# Patient Record
Sex: MALE | Race: WHITE | Employment: OTHER | URBAN - METROPOLITAN AREA
[De-identification: names, ages, dates, MRNs, and addresses within clinical notes are randomized per-mention and may not be internally consistent; named-entity substitution may affect disease eponyms.]

---

## 2017-11-03 ENCOUNTER — HOSPITAL ENCOUNTER (OUTPATIENT)
Dept: MRI IMAGING | Age: 82
Discharge: HOME | End: 2017-11-03
Attending: NEUROLOGICAL SURGERY
Payer: MEDICARE

## 2017-11-03 DIAGNOSIS — M48.062 LUMBAR STENOSIS WITH NEUROGENIC CLAUDICATION: ICD-10-CM

## 2017-11-03 PROCEDURE — 72148 MRI LUMBAR SPINE W/O DYE: CPT

## 2018-01-18 ENCOUNTER — HOSPITAL ENCOUNTER (OUTPATIENT)
Dept: MRI IMAGING | Age: 83
Discharge: HOME | End: 2018-01-18
Attending: NEUROLOGICAL SURGERY
Payer: MEDICARE

## 2018-01-18 DIAGNOSIS — S22.009A CLOSED FRACTURE OF DORSAL (THORACIC) VERTEBRA (HCC): ICD-10-CM

## 2018-01-18 PROCEDURE — 72146 MRI CHEST SPINE W/O DYE: CPT

## 2018-02-01 ENCOUNTER — HOSPITAL ENCOUNTER (OUTPATIENT)
Dept: PREADMISSION TESTING | Age: 83
Discharge: HOME | End: 2018-02-01
Payer: MEDICARE

## 2018-02-01 VITALS
HEART RATE: 63 BPM | SYSTOLIC BLOOD PRESSURE: 107 MMHG | WEIGHT: 191 LBS | BODY MASS INDEX: 28.29 KG/M2 | TEMPERATURE: 97.6 F | HEIGHT: 69 IN | DIASTOLIC BLOOD PRESSURE: 53 MMHG

## 2018-02-01 LAB
ABO + RH BLD: NORMAL
ANION GAP SERPL CALC-SCNC: 8 MMOL/L (ref 5–15)
APTT PPP: 27.3 SEC (ref 22.1–32.5)
BASOPHILS # BLD: 0.1 K/UL (ref 0–0.1)
BASOPHILS NFR BLD: 1 % (ref 0–1)
BLOOD GROUP ANTIBODIES SERPL: NORMAL
BUN SERPL-MCNC: 17 MG/DL (ref 6–20)
BUN/CREAT SERPL: 14 (ref 12–20)
CALCIUM SERPL-MCNC: 8.8 MG/DL (ref 8.5–10.1)
CHLORIDE SERPL-SCNC: 105 MMOL/L (ref 97–108)
CO2 SERPL-SCNC: 29 MMOL/L (ref 21–32)
CREAT SERPL-MCNC: 1.2 MG/DL (ref 0.7–1.3)
DIFFERENTIAL METHOD BLD: NORMAL
EOSINOPHIL # BLD: 0 K/UL (ref 0–0.4)
EOSINOPHIL NFR BLD: 0 % (ref 0–7)
ERYTHROCYTE [DISTWIDTH] IN BLOOD BY AUTOMATED COUNT: 13 % (ref 11.5–14.5)
GLUCOSE SERPL-MCNC: 119 MG/DL (ref 65–100)
HCT VFR BLD AUTO: 41.4 % (ref 36.6–50.3)
HGB BLD-MCNC: 13.7 G/DL (ref 12.1–17)
IMM GRANULOCYTES # BLD: 0 K/UL (ref 0–0.04)
IMM GRANULOCYTES NFR BLD AUTO: 0 % (ref 0–0.5)
INR PPP: 1 (ref 0.9–1.1)
LYMPHOCYTES # BLD: 2.4 K/UL (ref 0.8–3.5)
LYMPHOCYTES NFR BLD: 32 % (ref 12–49)
MCH RBC QN AUTO: 31.7 PG (ref 26–34)
MCHC RBC AUTO-ENTMCNC: 33.1 G/DL (ref 30–36.5)
MCV RBC AUTO: 95.8 FL (ref 80–99)
MONOCYTES # BLD: 0.6 K/UL (ref 0–1)
MONOCYTES NFR BLD: 9 % (ref 5–13)
NEUTS SEG # BLD: 4.4 K/UL (ref 1.8–8)
NEUTS SEG NFR BLD: 59 % (ref 32–75)
NRBC # BLD: 0 K/UL (ref 0–0.01)
NRBC BLD-RTO: 0 PER 100 WBC
PLATELET # BLD AUTO: 298 K/UL (ref 150–400)
PMV BLD AUTO: 9.5 FL (ref 8.9–12.9)
POTASSIUM SERPL-SCNC: 4.4 MMOL/L (ref 3.5–5.1)
PROTHROMBIN TIME: 10.5 SEC (ref 9–11.1)
RBC # BLD AUTO: 4.32 M/UL (ref 4.1–5.7)
SODIUM SERPL-SCNC: 142 MMOL/L (ref 136–145)
SPECIMEN EXP DATE BLD: NORMAL
THERAPEUTIC RANGE,PTTT: NORMAL SECS (ref 58–77)
WBC # BLD AUTO: 7.5 K/UL (ref 4.1–11.1)

## 2018-02-01 PROCEDURE — 85730 THROMBOPLASTIN TIME PARTIAL: CPT | Performed by: NEUROLOGICAL SURGERY

## 2018-02-01 PROCEDURE — 36415 COLL VENOUS BLD VENIPUNCTURE: CPT | Performed by: NEUROLOGICAL SURGERY

## 2018-02-01 PROCEDURE — 80048 BASIC METABOLIC PNL TOTAL CA: CPT | Performed by: NEUROLOGICAL SURGERY

## 2018-02-01 PROCEDURE — 93005 ELECTROCARDIOGRAM TRACING: CPT

## 2018-02-01 PROCEDURE — 86900 BLOOD TYPING SEROLOGIC ABO: CPT | Performed by: NEUROLOGICAL SURGERY

## 2018-02-01 PROCEDURE — 85610 PROTHROMBIN TIME: CPT | Performed by: NEUROLOGICAL SURGERY

## 2018-02-01 PROCEDURE — 85025 COMPLETE CBC W/AUTO DIFF WBC: CPT | Performed by: NEUROLOGICAL SURGERY

## 2018-02-01 RX ORDER — NAPROXEN SODIUM 220 MG
440 TABLET ORAL
COMMUNITY
End: 2018-02-10

## 2018-02-01 RX ORDER — POTASSIUM CHLORIDE 750 MG/1
TABLET, FILM COATED, EXTENDED RELEASE ORAL DAILY
COMMUNITY

## 2018-02-01 RX ORDER — CHOLECALCIFEROL (VITAMIN D3) 125 MCG
CAPSULE ORAL DAILY
COMMUNITY

## 2018-02-01 RX ORDER — VALSARTAN AND HYDROCHLOROTHIAZIDE 80; 12.5 MG/1; MG/1
1 TABLET, FILM COATED ORAL DAILY
COMMUNITY

## 2018-02-02 LAB
ATRIAL RATE: 55 BPM
BACTERIA SPEC CULT: NORMAL
BACTERIA SPEC CULT: NORMAL
CALCULATED P AXIS, ECG09: 41 DEGREES
CALCULATED R AXIS, ECG10: 0 DEGREES
CALCULATED T AXIS, ECG11: 32 DEGREES
DIAGNOSIS, 93000: NORMAL
P-R INTERVAL, ECG05: 172 MS
Q-T INTERVAL, ECG07: 418 MS
QRS DURATION, ECG06: 80 MS
QTC CALCULATION (BEZET), ECG08: 399 MS
SERVICE CMNT-IMP: NORMAL
VENTRICULAR RATE, ECG03: 55 BPM

## 2018-02-07 ENCOUNTER — ANESTHESIA EVENT (OUTPATIENT)
Dept: SURGERY | Age: 83
DRG: 460 | End: 2018-02-07
Payer: MEDICARE

## 2018-02-08 ENCOUNTER — HOSPITAL ENCOUNTER (INPATIENT)
Age: 83
LOS: 2 days | Discharge: HOME | DRG: 460 | End: 2018-02-10
Attending: NEUROLOGICAL SURGERY | Admitting: NEUROLOGICAL SURGERY
Payer: MEDICARE

## 2018-02-08 ENCOUNTER — APPOINTMENT (OUTPATIENT)
Dept: GENERAL RADIOLOGY | Age: 83
DRG: 460 | End: 2018-02-08
Attending: NEUROLOGICAL SURGERY
Payer: MEDICARE

## 2018-02-08 ENCOUNTER — ANESTHESIA (OUTPATIENT)
Dept: SURGERY | Age: 83
DRG: 460 | End: 2018-02-08
Payer: MEDICARE

## 2018-02-08 DIAGNOSIS — M48.062 LUMBAR STENOSIS WITH NEUROGENIC CLAUDICATION: Primary | ICD-10-CM

## 2018-02-08 PROBLEM — M48.061 LUMBAR STENOSIS: Status: ACTIVE | Noted: 2018-02-08

## 2018-02-08 PROBLEM — G95.19 NEUROGENIC CLAUDICATION (HCC): Status: ACTIVE | Noted: 2018-02-08

## 2018-02-08 LAB
GLUCOSE BLD STRIP.AUTO-MCNC: 99 MG/DL (ref 65–100)
SERVICE CMNT-IMP: NORMAL

## 2018-02-08 PROCEDURE — 77030004391 HC BUR FLUT MEDT -C: Performed by: NEUROLOGICAL SURGERY

## 2018-02-08 PROCEDURE — 77030002933 HC SUT MCRYL J&J -A: Performed by: NEUROLOGICAL SURGERY

## 2018-02-08 PROCEDURE — 77030007115 HC CGE SPN PEK CRNT NUVA -I3: Performed by: NEUROLOGICAL SURGERY

## 2018-02-08 PROCEDURE — 0SG00A0 FUSION OF LUMBAR VERTEBRAL JOINT WITH INTERBODY FUSION DEVICE, ANTERIOR APPROACH, ANTERIOR COLUMN, OPEN APPROACH: ICD-10-PCS | Performed by: NEUROLOGICAL SURGERY

## 2018-02-08 PROCEDURE — 77030034850: Performed by: NEUROLOGICAL SURGERY

## 2018-02-08 PROCEDURE — 76010000171 HC OR TIME 2 TO 2.5 HR INTENSV-TIER 1: Performed by: NEUROLOGICAL SURGERY

## 2018-02-08 PROCEDURE — 76060000035 HC ANESTHESIA 2 TO 2.5 HR: Performed by: NEUROLOGICAL SURGERY

## 2018-02-08 PROCEDURE — 77030030409 HC DIL SPN KT M5 DISP NUVA -G: Performed by: NEUROLOGICAL SURGERY

## 2018-02-08 PROCEDURE — 74011250636 HC RX REV CODE- 250/636

## 2018-02-08 PROCEDURE — 77030008684 HC TU ET CUF COVD -B: Performed by: NURSE ANESTHETIST, CERTIFIED REGISTERED

## 2018-02-08 PROCEDURE — 77030038600 HC TU BPLR IRR DISP STRY -B: Performed by: NEUROLOGICAL SURGERY

## 2018-02-08 PROCEDURE — 74011000272 HC RX REV CODE- 272: Performed by: NEUROLOGICAL SURGERY

## 2018-02-08 PROCEDURE — 77030019908 HC STETH ESOPH SIMS -A: Performed by: NURSE ANESTHETIST, CERTIFIED REGISTERED

## 2018-02-08 PROCEDURE — 74011250636 HC RX REV CODE- 250/636: Performed by: NEUROLOGICAL SURGERY

## 2018-02-08 PROCEDURE — 74011000250 HC RX REV CODE- 250

## 2018-02-08 PROCEDURE — 77030032490 HC SLV COMPR SCD KNE COVD -B: Performed by: NEUROLOGICAL SURGERY

## 2018-02-08 PROCEDURE — 77030008467 HC STPLR SKN COVD -B: Performed by: NEUROLOGICAL SURGERY

## 2018-02-08 PROCEDURE — 77030013079 HC BLNKT BAIR HGGR 3M -A: Performed by: NURSE ANESTHETIST, CERTIFIED REGISTERED

## 2018-02-08 PROCEDURE — 65270000032 HC RM SEMIPRIVATE

## 2018-02-08 PROCEDURE — 77030027703 HC MAXCESS 4 KT DISP NUVA -H: Performed by: NEUROLOGICAL SURGERY

## 2018-02-08 PROCEDURE — 77030020486 HC ELECTRD EMG KT NUVA -G1: Performed by: NEUROLOGICAL SURGERY

## 2018-02-08 PROCEDURE — 77030029099 HC BN WAX SSPC -A: Performed by: NEUROLOGICAL SURGERY

## 2018-02-08 PROCEDURE — 82962 GLUCOSE BLOOD TEST: CPT

## 2018-02-08 PROCEDURE — 74011250636 HC RX REV CODE- 250/636: Performed by: ANESTHESIOLOGY

## 2018-02-08 PROCEDURE — 77030010507 HC ADH SKN DERMBND J&J -B: Performed by: NEUROLOGICAL SURGERY

## 2018-02-08 PROCEDURE — 77030003029 HC SUT VCRL J&J -B: Performed by: NEUROLOGICAL SURGERY

## 2018-02-08 PROCEDURE — 72100 X-RAY EXAM L-S SPINE 2/3 VWS: CPT

## 2018-02-08 PROCEDURE — 77030020782 HC GWN BAIR PAWS FLX 3M -B

## 2018-02-08 PROCEDURE — 77030012890

## 2018-02-08 PROCEDURE — 76001 XR FLUOROSCOPY OVER 60 MINUTES: CPT

## 2018-02-08 PROCEDURE — C1713 ANCHOR/SCREW BN/BN,TIS/BN: HCPCS | Performed by: NEUROLOGICAL SURGERY

## 2018-02-08 PROCEDURE — 74011000250 HC RX REV CODE- 250: Performed by: NEUROLOGICAL SURGERY

## 2018-02-08 PROCEDURE — 77030025706 HC GRFT BN SUB DBM NUVA -I3: Performed by: NEUROLOGICAL SURGERY

## 2018-02-08 PROCEDURE — 76210000001 HC OR PH I REC 2.5 TO 3 HR: Performed by: NEUROLOGICAL SURGERY

## 2018-02-08 PROCEDURE — 8E0WXBF COMPUTER ASSISTED PROCEDURE OF TRUNK REGION, WITH FLUOROSCOPY: ICD-10-PCS | Performed by: NEUROLOGICAL SURGERY

## 2018-02-08 DEVICE — BOLT SPNL L L45MM DIA5.5MM TI XLIF THRD DECADE: Type: IMPLANTABLE DEVICE | Site: SPINE LUMBAR | Status: FUNCTIONAL

## 2018-02-08 DEVICE — GRAFT CHIP CANC OSTEOCEL 10CC: Type: IMPLANTABLE DEVICE | Site: SPINE LUMBAR | Status: FUNCTIONAL

## 2018-02-08 DEVICE — IMPLANTABLE DEVICE: Type: IMPLANTABLE DEVICE | Site: SPINE LUMBAR | Status: FUNCTIONAL

## 2018-02-08 RX ORDER — LOSARTAN POTASSIUM 50 MG/1
50 TABLET ORAL DAILY
Status: DISCONTINUED | OUTPATIENT
Start: 2018-02-09 | End: 2018-02-10 | Stop reason: HOSPADM

## 2018-02-08 RX ORDER — VANCOMYCIN/0.9 % SOD CHLORIDE 1 G/100 ML
1000 PLASTIC BAG, INJECTION (ML) INTRAVENOUS ONCE
Status: DISCONTINUED | OUTPATIENT
Start: 2018-02-08 | End: 2018-02-08 | Stop reason: DRUGHIGH

## 2018-02-08 RX ORDER — GLYCOPYRROLATE 0.2 MG/ML
INJECTION INTRAMUSCULAR; INTRAVENOUS AS NEEDED
Status: DISCONTINUED | OUTPATIENT
Start: 2018-02-08 | End: 2018-02-08 | Stop reason: HOSPADM

## 2018-02-08 RX ORDER — HYDROMORPHONE HYDROCHLORIDE 2 MG/1
2 TABLET ORAL
Status: DISCONTINUED | OUTPATIENT
Start: 2018-02-08 | End: 2018-02-10 | Stop reason: HOSPADM

## 2018-02-08 RX ORDER — MIDAZOLAM HYDROCHLORIDE 1 MG/ML
0.5 INJECTION, SOLUTION INTRAMUSCULAR; INTRAVENOUS
Status: DISCONTINUED | OUTPATIENT
Start: 2018-02-08 | End: 2018-02-08 | Stop reason: HOSPADM

## 2018-02-08 RX ORDER — HYDROCHLOROTHIAZIDE 25 MG/1
12.5 TABLET ORAL DAILY
Status: DISCONTINUED | OUTPATIENT
Start: 2018-02-09 | End: 2018-02-10 | Stop reason: HOSPADM

## 2018-02-08 RX ORDER — FENTANYL CITRATE 50 UG/ML
INJECTION, SOLUTION INTRAMUSCULAR; INTRAVENOUS AS NEEDED
Status: DISCONTINUED | OUTPATIENT
Start: 2018-02-08 | End: 2018-02-08 | Stop reason: HOSPADM

## 2018-02-08 RX ORDER — SODIUM CHLORIDE 9 MG/ML
125 INJECTION, SOLUTION INTRAVENOUS CONTINUOUS
Status: DISPENSED | OUTPATIENT
Start: 2018-02-08 | End: 2018-02-09

## 2018-02-08 RX ORDER — SODIUM CHLORIDE, SODIUM LACTATE, POTASSIUM CHLORIDE, CALCIUM CHLORIDE 600; 310; 30; 20 MG/100ML; MG/100ML; MG/100ML; MG/100ML
INJECTION, SOLUTION INTRAVENOUS
Status: DISCONTINUED | OUTPATIENT
Start: 2018-02-08 | End: 2018-02-08 | Stop reason: HOSPADM

## 2018-02-08 RX ORDER — KETOROLAC TROMETHAMINE 30 MG/ML
15 INJECTION, SOLUTION INTRAMUSCULAR; INTRAVENOUS EVERY 6 HOURS
Status: COMPLETED | OUTPATIENT
Start: 2018-02-08 | End: 2018-02-09

## 2018-02-08 RX ORDER — HYDROMORPHONE HYDROCHLORIDE 4 MG/1
4 TABLET ORAL
Status: DISCONTINUED | OUTPATIENT
Start: 2018-02-08 | End: 2018-02-10 | Stop reason: HOSPADM

## 2018-02-08 RX ORDER — SUCCINYLCHOLINE CHLORIDE 20 MG/ML
INJECTION INTRAMUSCULAR; INTRAVENOUS AS NEEDED
Status: DISCONTINUED | OUTPATIENT
Start: 2018-02-08 | End: 2018-02-08 | Stop reason: HOSPADM

## 2018-02-08 RX ORDER — DIPHENHYDRAMINE HCL 25 MG
25 CAPSULE ORAL
Status: DISCONTINUED | OUTPATIENT
Start: 2018-02-08 | End: 2018-02-10 | Stop reason: HOSPADM

## 2018-02-08 RX ORDER — SODIUM CHLORIDE 0.9 % (FLUSH) 0.9 %
5-10 SYRINGE (ML) INJECTION AS NEEDED
Status: DISCONTINUED | OUTPATIENT
Start: 2018-02-08 | End: 2018-02-08 | Stop reason: HOSPADM

## 2018-02-08 RX ORDER — ROCURONIUM BROMIDE 10 MG/ML
INJECTION, SOLUTION INTRAVENOUS AS NEEDED
Status: DISCONTINUED | OUTPATIENT
Start: 2018-02-08 | End: 2018-02-08 | Stop reason: HOSPADM

## 2018-02-08 RX ORDER — SODIUM CHLORIDE, SODIUM LACTATE, POTASSIUM CHLORIDE, CALCIUM CHLORIDE 600; 310; 30; 20 MG/100ML; MG/100ML; MG/100ML; MG/100ML
100 INJECTION, SOLUTION INTRAVENOUS CONTINUOUS
Status: DISCONTINUED | OUTPATIENT
Start: 2018-02-08 | End: 2018-02-08 | Stop reason: HOSPADM

## 2018-02-08 RX ORDER — ONDANSETRON 2 MG/ML
INJECTION INTRAMUSCULAR; INTRAVENOUS AS NEEDED
Status: DISCONTINUED | OUTPATIENT
Start: 2018-02-08 | End: 2018-02-08 | Stop reason: HOSPADM

## 2018-02-08 RX ORDER — VANCOMYCIN 1.75 GRAM/500 ML IN 0.9 % SODIUM CHLORIDE INTRAVENOUS
1750 ONCE
Status: COMPLETED | OUTPATIENT
Start: 2018-02-08 | End: 2018-02-08

## 2018-02-08 RX ORDER — VANCOMYCIN 1.75 GRAM/500 ML IN 0.9 % SODIUM CHLORIDE INTRAVENOUS
1750 EVERY 12 HOURS
Status: COMPLETED | OUTPATIENT
Start: 2018-02-08 | End: 2018-02-08

## 2018-02-08 RX ORDER — HYDROMORPHONE HYDROCHLORIDE 1 MG/ML
1 INJECTION, SOLUTION INTRAMUSCULAR; INTRAVENOUS; SUBCUTANEOUS
Status: DISPENSED | OUTPATIENT
Start: 2018-02-08 | End: 2018-02-09

## 2018-02-08 RX ORDER — SODIUM CHLORIDE 0.9 % (FLUSH) 0.9 %
5-10 SYRINGE (ML) INJECTION EVERY 8 HOURS
Status: DISCONTINUED | OUTPATIENT
Start: 2018-02-08 | End: 2018-02-08 | Stop reason: HOSPADM

## 2018-02-08 RX ORDER — PROPOFOL 10 MG/ML
INJECTION, EMULSION INTRAVENOUS AS NEEDED
Status: DISCONTINUED | OUTPATIENT
Start: 2018-02-08 | End: 2018-02-08 | Stop reason: HOSPADM

## 2018-02-08 RX ORDER — AMOXICILLIN 250 MG
1 CAPSULE ORAL DAILY
Status: DISCONTINUED | OUTPATIENT
Start: 2018-02-09 | End: 2018-02-10 | Stop reason: HOSPADM

## 2018-02-08 RX ORDER — SODIUM CHLORIDE 0.9 % (FLUSH) 0.9 %
5-10 SYRINGE (ML) INJECTION EVERY 8 HOURS
Status: DISCONTINUED | OUTPATIENT
Start: 2018-02-09 | End: 2018-02-10 | Stop reason: HOSPADM

## 2018-02-08 RX ORDER — BUPIVACAINE HYDROCHLORIDE 5 MG/ML
30 INJECTION, SOLUTION EPIDURAL; INTRACAUDAL ONCE
Status: COMPLETED | OUTPATIENT
Start: 2018-02-08 | End: 2018-02-08

## 2018-02-08 RX ORDER — PROPOFOL 10 MG/ML
INJECTION, EMULSION INTRAVENOUS
Status: DISCONTINUED | OUTPATIENT
Start: 2018-02-08 | End: 2018-02-08 | Stop reason: HOSPADM

## 2018-02-08 RX ORDER — MIDAZOLAM HYDROCHLORIDE 1 MG/ML
1 INJECTION, SOLUTION INTRAMUSCULAR; INTRAVENOUS AS NEEDED
Status: DISCONTINUED | OUTPATIENT
Start: 2018-02-08 | End: 2018-02-08 | Stop reason: HOSPADM

## 2018-02-08 RX ORDER — MIDAZOLAM HYDROCHLORIDE 1 MG/ML
INJECTION, SOLUTION INTRAMUSCULAR; INTRAVENOUS AS NEEDED
Status: DISCONTINUED | OUTPATIENT
Start: 2018-02-08 | End: 2018-02-08 | Stop reason: HOSPADM

## 2018-02-08 RX ORDER — FENTANYL CITRATE 50 UG/ML
50 INJECTION, SOLUTION INTRAMUSCULAR; INTRAVENOUS AS NEEDED
Status: DISCONTINUED | OUTPATIENT
Start: 2018-02-08 | End: 2018-02-08 | Stop reason: HOSPADM

## 2018-02-08 RX ORDER — LIDOCAINE HYDROCHLORIDE 10 MG/ML
0.1 INJECTION, SOLUTION EPIDURAL; INFILTRATION; INTRACAUDAL; PERINEURAL AS NEEDED
Status: DISCONTINUED | OUTPATIENT
Start: 2018-02-08 | End: 2018-02-08 | Stop reason: HOSPADM

## 2018-02-08 RX ORDER — POTASSIUM CHLORIDE 750 MG/1
10 TABLET, FILM COATED, EXTENDED RELEASE ORAL DAILY
Status: DISCONTINUED | OUTPATIENT
Start: 2018-02-09 | End: 2018-02-10 | Stop reason: HOSPADM

## 2018-02-08 RX ORDER — ONDANSETRON 2 MG/ML
4 INJECTION INTRAMUSCULAR; INTRAVENOUS
Status: ACTIVE | OUTPATIENT
Start: 2018-02-08 | End: 2018-02-09

## 2018-02-08 RX ORDER — DIPHENHYDRAMINE HYDROCHLORIDE 50 MG/ML
12.5 INJECTION, SOLUTION INTRAMUSCULAR; INTRAVENOUS AS NEEDED
Status: DISCONTINUED | OUTPATIENT
Start: 2018-02-08 | End: 2018-02-08 | Stop reason: HOSPADM

## 2018-02-08 RX ORDER — DEXAMETHASONE SODIUM PHOSPHATE 4 MG/ML
INJECTION, SOLUTION INTRA-ARTICULAR; INTRALESIONAL; INTRAMUSCULAR; INTRAVENOUS; SOFT TISSUE AS NEEDED
Status: DISCONTINUED | OUTPATIENT
Start: 2018-02-08 | End: 2018-02-08 | Stop reason: HOSPADM

## 2018-02-08 RX ORDER — ROPIVACAINE HYDROCHLORIDE 5 MG/ML
150 INJECTION, SOLUTION EPIDURAL; INFILTRATION; PERINEURAL AS NEEDED
Status: DISCONTINUED | OUTPATIENT
Start: 2018-02-08 | End: 2018-02-08 | Stop reason: HOSPADM

## 2018-02-08 RX ORDER — NALOXONE HYDROCHLORIDE 0.4 MG/ML
0.4 INJECTION, SOLUTION INTRAMUSCULAR; INTRAVENOUS; SUBCUTANEOUS AS NEEDED
Status: DISCONTINUED | OUTPATIENT
Start: 2018-02-08 | End: 2018-02-10 | Stop reason: HOSPADM

## 2018-02-08 RX ORDER — MORPHINE SULFATE 4 MG/ML
INJECTION, SOLUTION INTRAMUSCULAR; INTRAVENOUS AS NEEDED
Status: DISCONTINUED | OUTPATIENT
Start: 2018-02-08 | End: 2018-02-08 | Stop reason: HOSPADM

## 2018-02-08 RX ORDER — FENTANYL CITRATE 50 UG/ML
25 INJECTION, SOLUTION INTRAMUSCULAR; INTRAVENOUS
Status: DISCONTINUED | OUTPATIENT
Start: 2018-02-08 | End: 2018-02-08 | Stop reason: HOSPADM

## 2018-02-08 RX ORDER — SODIUM CHLORIDE 0.9 % (FLUSH) 0.9 %
5-10 SYRINGE (ML) INJECTION AS NEEDED
Status: DISCONTINUED | OUTPATIENT
Start: 2018-02-08 | End: 2018-02-10 | Stop reason: HOSPADM

## 2018-02-08 RX ADMIN — KETOROLAC TROMETHAMINE 15 MG: 30 INJECTION, SOLUTION INTRAMUSCULAR at 17:53

## 2018-02-08 RX ADMIN — MEPERIDINE HYDROCHLORIDE 12.5 MG: 25 INJECTION INTRAMUSCULAR; INTRAVENOUS; SUBCUTANEOUS at 10:16

## 2018-02-08 RX ADMIN — KETOROLAC TROMETHAMINE 15 MG: 30 INJECTION, SOLUTION INTRAMUSCULAR at 13:05

## 2018-02-08 RX ADMIN — FENTANYL CITRATE 25 MCG: 50 INJECTION, SOLUTION INTRAMUSCULAR; INTRAVENOUS at 09:42

## 2018-02-08 RX ADMIN — GLYCOPYRROLATE 0.1 MG: 0.2 INJECTION INTRAMUSCULAR; INTRAVENOUS at 08:53

## 2018-02-08 RX ADMIN — ONDANSETRON 4 MG: 2 INJECTION INTRAMUSCULAR; INTRAVENOUS at 09:18

## 2018-02-08 RX ADMIN — PROPOFOL 50 MG: 10 INJECTION, EMULSION INTRAVENOUS at 07:39

## 2018-02-08 RX ADMIN — GLYCOPYRROLATE 0.1 MG: 0.2 INJECTION INTRAMUSCULAR; INTRAVENOUS at 08:50

## 2018-02-08 RX ADMIN — VANCOMYCIN HYDROCHLORIDE 1750 MG: 10 INJECTION, POWDER, LYOPHILIZED, FOR SOLUTION INTRAVENOUS at 06:53

## 2018-02-08 RX ADMIN — HYDROMORPHONE HYDROCHLORIDE 1 MG: 1 INJECTION, SOLUTION INTRAMUSCULAR; INTRAVENOUS; SUBCUTANEOUS at 13:05

## 2018-02-08 RX ADMIN — FENTANYL CITRATE 100 MCG: 50 INJECTION, SOLUTION INTRAMUSCULAR; INTRAVENOUS at 07:39

## 2018-02-08 RX ADMIN — PROPOFOL 50 MG: 10 INJECTION, EMULSION INTRAVENOUS at 07:37

## 2018-02-08 RX ADMIN — VANCOMYCIN HYDROCHLORIDE 1750 MG: 10 INJECTION, POWDER, LYOPHILIZED, FOR SOLUTION INTRAVENOUS at 18:59

## 2018-02-08 RX ADMIN — MEPERIDINE HYDROCHLORIDE 12.5 MG: 25 INJECTION INTRAMUSCULAR; INTRAVENOUS; SUBCUTANEOUS at 11:22

## 2018-02-08 RX ADMIN — MORPHINE SULFATE 1 MG: 4 INJECTION, SOLUTION INTRAMUSCULAR; INTRAVENOUS at 09:53

## 2018-02-08 RX ADMIN — MIDAZOLAM 0.5 MG: 1 INJECTION INTRAMUSCULAR; INTRAVENOUS at 10:16

## 2018-02-08 RX ADMIN — SODIUM CHLORIDE, SODIUM LACTATE, POTASSIUM CHLORIDE, CALCIUM CHLORIDE: 600; 310; 30; 20 INJECTION, SOLUTION INTRAVENOUS at 07:27

## 2018-02-08 RX ADMIN — PROPOFOL 35 MCG/KG/MIN: 10 INJECTION, EMULSION INTRAVENOUS at 07:47

## 2018-02-08 RX ADMIN — FENTANYL CITRATE 50 MCG: 50 INJECTION, SOLUTION INTRAMUSCULAR; INTRAVENOUS at 07:41

## 2018-02-08 RX ADMIN — FENTANYL CITRATE 25 MCG: 50 INJECTION, SOLUTION INTRAMUSCULAR; INTRAVENOUS at 10:45

## 2018-02-08 RX ADMIN — SODIUM CHLORIDE 125 ML/HR: 900 INJECTION, SOLUTION INTRAVENOUS at 19:00

## 2018-02-08 RX ADMIN — PROPOFOL 50 MG: 10 INJECTION, EMULSION INTRAVENOUS at 07:38

## 2018-02-08 RX ADMIN — FENTANYL CITRATE 25 MCG: 50 INJECTION, SOLUTION INTRAMUSCULAR; INTRAVENOUS at 11:21

## 2018-02-08 RX ADMIN — FENTANYL CITRATE 25 MCG: 50 INJECTION, SOLUTION INTRAMUSCULAR; INTRAVENOUS at 08:32

## 2018-02-08 RX ADMIN — MIDAZOLAM HYDROCHLORIDE 1 MG: 1 INJECTION, SOLUTION INTRAMUSCULAR; INTRAVENOUS at 07:32

## 2018-02-08 RX ADMIN — FENTANYL CITRATE 100 MCG: 50 INJECTION, SOLUTION INTRAMUSCULAR; INTRAVENOUS at 07:37

## 2018-02-08 RX ADMIN — HYDROMORPHONE HYDROCHLORIDE 1 MG: 1 INJECTION, SOLUTION INTRAMUSCULAR; INTRAVENOUS; SUBCUTANEOUS at 18:59

## 2018-02-08 RX ADMIN — SUCCINYLCHOLINE CHLORIDE 180 MG: 20 INJECTION INTRAMUSCULAR; INTRAVENOUS at 07:40

## 2018-02-08 RX ADMIN — DEXAMETHASONE SODIUM PHOSPHATE 6 MG: 4 INJECTION, SOLUTION INTRA-ARTICULAR; INTRALESIONAL; INTRAMUSCULAR; INTRAVENOUS; SOFT TISSUE at 09:18

## 2018-02-08 RX ADMIN — FENTANYL CITRATE 25 MCG: 50 INJECTION, SOLUTION INTRAMUSCULAR; INTRAVENOUS at 10:17

## 2018-02-08 RX ADMIN — FENTANYL CITRATE 25 MCG: 50 INJECTION, SOLUTION INTRAMUSCULAR; INTRAVENOUS at 10:55

## 2018-02-08 RX ADMIN — SODIUM CHLORIDE, SODIUM LACTATE, POTASSIUM CHLORIDE, CALCIUM CHLORIDE: 600; 310; 30; 20 INJECTION, SOLUTION INTRAVENOUS at 09:59

## 2018-02-08 RX ADMIN — MIDAZOLAM 0.5 MG: 1 INJECTION INTRAMUSCULAR; INTRAVENOUS at 10:27

## 2018-02-08 RX ADMIN — MIDAZOLAM HYDROCHLORIDE 1 MG: 1 INJECTION, SOLUTION INTRAMUSCULAR; INTRAVENOUS at 07:27

## 2018-02-08 RX ADMIN — ROCURONIUM BROMIDE 5 MG: 10 INJECTION, SOLUTION INTRAVENOUS at 07:39

## 2018-02-08 RX ADMIN — SODIUM CHLORIDE 125 ML/HR: 900 INJECTION, SOLUTION INTRAVENOUS at 11:15

## 2018-02-08 RX ADMIN — SODIUM CHLORIDE, SODIUM LACTATE, POTASSIUM CHLORIDE, AND CALCIUM CHLORIDE 100 ML/HR: 600; 310; 30; 20 INJECTION, SOLUTION INTRAVENOUS at 06:43

## 2018-02-08 NOTE — BRIEF OP NOTE
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  Item Serial No.  Lot No. LRB No. Used Action   GRAFT BNE MATRIX OSTEOCEL 10CC --  - [id number] Bone GRAFT BNE MATRIX OSTEOCEL 10CC --  [id number] NUVASIVE NA N/A 1 Implanted                1 Implanted

## 2018-02-08 NOTE — ANESTHESIA POSTPROCEDURE EVALUATION
Post-Anesthesia Evaluation and Assessment    Patient: Andreia Macdonald MRN: [id number]  SSN: [id number]    YOB: 1935  Age: 81 y.o. Sex: male       Cardiovascular Function/Vital Signs  Visit Vitals    /74    Pulse 86    Temp 36.6 °C (97.9 °F)    Resp 11    Ht 5' 9.25\" (1.759 m)    Wt 86.6 kg (191 lb)    SpO2 91%    BMI 28 kg/m2       Patient is status post general anesthesia for Procedure(s):  L3-4 XLIF WITH ALLOGRAFT AND PLATE (OXYGEN). Nausea/Vomiting: None    Postoperative hydration reviewed and adequate. Pain:  Pain Scale 1: Adult Nonverbal Pain Scale (02/08/18 1005)  Pain Intensity 1: 6 (02/08/18 1005)   Managed    Neurological Status:   Neuro (WDL): Within Defined Limits (02/08/18 1005)  Neuro  Neurologic State: Drowsy; Pharmacologically induced (comment) (02/08/18 1005)  LUE Motor Response: Purposeful (02/08/18 1005)  LLE Motor Response: Purposeful (02/08/18 1005)  RUE Motor Response: Purposeful (02/08/18 1005)  RLE Motor Response: Purposeful (02/08/18 1005)   At baseline    Mental Status and Level of Consciousness: Arousable    Pulmonary Status:   O2 Device: Non-rebreather mask (02/08/18 1005)   Adequate oxygenation and airway patent    Complications related to anesthesia: None    Post-anesthesia assessment completed.  No concerns    Signed By: Bren Lenz MD     February 8, 2018

## 2018-02-08 NOTE — PERIOP NOTES
Patient: Letty Acosta MRN: [id number]  SSN: [id number]   YOB: 1935  Age: 81 y.o. Sex: male     Patient is status post Procedure(s):  L3-4 XLIF WITH ALLOGRAFT AND PLATE (OXYGEN).     Surgeon(s) and Role:     * Delroy Winters MD - Primary    Local/Dose/Irrigation:  0.5% BUPIVACAINE                Peripheral IV 02/08/18 Right Wrist (Active)                           Dressing/Packing:  Wound Flank Left-DRESSING TYPE: Topical skin adhesive/glue (02/08/18 0900)  Splint/Cast:  ]    Other:

## 2018-02-08 NOTE — BRIEF OP NOTE
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  Item Serial No.  Lot No. LRB No. Used Action   GRAFT BNE MATRIX OSTEOCEL 10CC --  - [id number] Bone GRAFT BNE MATRIX OSTEOCEL 10CC --  [id number] NUVASIVE NA N/A 1 Implanted

## 2018-02-08 NOTE — IP AVS SNAPSHOT
[address] [facility] [address] [place] [address] 
[phone] Patient: Jerome Chou MRN: [id number] :1935 About your hospitalization You were admitted on:  2018 You last received care in the:  [address] [place] [address] You were discharged on:  February 10, 2018 Why you were hospitalized Your primary diagnosis was:  Not on File Your diagnoses also included:  Lumbar Stenosis With Neurogenic Claudication Follow-up Information Follow up With Details Comments Contact Info Katy Francois MD   [address] [facility] [address] [place] [address] 7 [phone] 
[phone] Discharge Orders None A check kristin indicates which time of day the medication should be taken. My Medications START taking these medications Instructions Each Dose to Equal  
 Morning Noon Evening Bedtime  
 cyclobenzaprine 10 mg tablet Commonly known as:  FLEXERIL Your last dose was: Your next dose is: Take 1 Tab by mouth three (3) times daily as needed for Muscle Spasm(s). Indications: Muscle Spasm  
 10 mg HYDROmorphone 2 mg tablet Commonly known as:  DILAUDID Your last dose was: Your next dose is: Take 1 Tab by mouth every four (4) hours as needed. Max Daily Amount: 12 mg.  
 2 mg  
    
   
   
   
  
 senna-docusate 8.6-50 mg per tablet Commonly known as:  Skyler Nataliia Your last dose was: Your next dose is: Take 1 Tab by mouth two (2) times a day. 1 Tab CONTINUE taking these medications Instructions Each Dose to Equal  
 Morning Noon Evening Bedtime  
 aspirin 81 mg chewable tablet Your last dose was: Your next dose is: Take 81 mg by mouth daily. 81 mg  
    
   
   
   
  
 potassium chloride SR 10 mEq tablet Commonly known as:  KLOR-CON 10  
   
 Your last dose was: Your next dose is: Take  by mouth daily. valsartan-hydroCHLOROthiazide 80-12.5 mg per tablet Commonly known as:  DIOVAN-HCT Your last dose was: Your next dose is: Take 1 Tab by mouth daily. 1 Tab VITAMIN D3 2,000 unit Tab Generic drug:  cholecalciferol (vitamin D3) Your last dose was: Your next dose is: Take  by mouth daily. STOP taking these medications ALEVE 220 mg tablet Generic drug:  naproxen sodium Where to Get Your Medications Information on where to get these meds will be given to you by the nurse or doctor. ! Ask your nurse or doctor about these medications  
  cyclobenzaprine 10 mg tablet HYDROmorphone 2 mg tablet  
 senna-docusate 8.6-50 mg per tablet Discharge Instructions Lumbar Spinal Fusion: What to Expect at Home Your Recovery After surgery, you can expect your back to feel stiff and sore. You may have trouble sitting or standing in one position for very long and may need pain medicine in the weeks after your surgery. It may take 4 to 6 weeks to get back to doing simple activities, such as light housework. It may take 6 months to a year for your back to get better completely. You may need to wear a back brace while your back heals. And your doctor may have you go to physical therapy. If your job doesn't require physical labor, you will probably be able to go back to work after 1 or 2 months. If your job involves light physical labor, it may take 3 to 6 months. Most people whose jobs involved heavy labor can never return to those jobs. This care sheet gives you a general idea about how long it will take for you to recover. But each person recovers at a different pace. Follow the steps below to get better as quickly as possible. How can you care for yourself at home? Activity ? · Rest when you feel tired. Getting enough sleep will help you recover. ? · Try to walk each day. Start by walking a little more than you did the day before. Bit by bit, increase the amount you walk. Walking boosts blood flow and helps prevent pneumonia and constipation. Walking may also decrease your muscle soreness after surgery. ? · If advised by your doctor, you may need to avoid lifting anything that would cause excessive strain on your back. This may include a child, heavy grocery bags and milk containers, a heavy briefcase or backpack, cat litter or dog food bags, or a vacuum . ? · Avoid strenuous activities, such as bicycle riding, jogging, weight lifting, or aerobic exercise, until your doctor says it is okay. ? · Do not drive for 2 to 4 weeks after your surgery or until your doctor says it is okay. ? · Avoid riding in a car for more than 30 minutes at a time for 2 to 4 weeks after surgery. If you must ride in a car for a longer distance, stop often to walk and stretch your legs. ? · Try to change your position about every 30 minutes while sitting or standing. This will help decrease your back pain while you are healing. ? · You will probably need to take at least 4 to 6 weeks off from work. It depends on the type of work you do and how you feel. ? · You may have sex as soon as you feel able, but avoid positions that put stress on your back or cause pain. Diet ? · You can eat your normal diet. If your stomach is upset, try bland, low-fat foods like plain rice, broiled chicken, toast, and yogurt. ? · Drink plenty of fluids (unless your doctor tells you not to). ? · You may notice that your bowel movements are not regular right after your surgery. This is common. Try to avoid constipation and straining with bowel movements. You may want to take a fiber supplement every day.  If you have not had a bowel movement after a couple of days, ask your doctor about taking a mild laxative. Medicines ? · Be safe with medicines. Take pain medicines exactly as directed. ¨ If the doctor gave you a prescription medicine for pain, take it as prescribed. ¨ If you are not taking a prescription pain medicine, ask your doctor if you can take an over-the-counter medicine. ? · If your doctor prescribed antibiotics, take them as directed. Do not stop taking them just because you feel better. You need to take the full course of antibiotics. ? · If you think your pain medicine is making you sick to your stomach: 
¨ Take your medicine after meals (unless your doctor has told you not to). ¨ Ask your doctor for a different pain medicine. Incision care ? · You will be given specific instructions about how to care for the cuts (incisions) the doctor made. The instructions will depend on the type of materials used to close the cut. Exercise ? · Do back exercises as instructed by your doctor. ? · Your doctor may advise you to work with a physical therapist to improve the strength and flexibility of your back. Other instructions ? · To reduce stiffness and help sore muscles, use a warm water bottle, a heating pad set on low, or a warm cloth on your back. Do not put heat right over the incision. Do not go to sleep with a heating pad on your skin. Follow-up care is a key part of your treatment and safety. Be sure to make and go to all appointments, and call your doctor if you are having problems. It's also a good idea to know your test results and keep a list of the medicines you take. When should you call for help? Call 911 anytime you think you may need emergency care. For example, call if: 
? · You passed out (lost consciousness). ? · You have sudden chest pain and shortness of breath, or you cough up blood. ? · You are unable to move a leg at all. ?Call your doctor now or seek immediate medical care if: 
? · You have pain that does not get better after you take pain pills. ? · You have new or worse symptoms in your legs or buttocks. Symptoms may include: ¨ Numbness or tingling. ¨ Weakness. ¨ Pain. ? · You lose bladder or bowel control. ? · You have loose stitches, or your incision comes open. ? · You have blood or fluid draining from the incision. ? · You have signs of infection, such as: 
¨ Increased pain, swelling, warmth, or redness. ¨ Pus draining from the incision. ¨ A fever. ? Watch closely for any changes in your health, and be sure to contact your doctor if: 
? · You do not have a bowel movement after taking a laxative. ? · You are not getting better as expected. Where can you learn more? Go to http://roopa-suzanne.info/. Enter B275 in the search box to learn more about \"Lumbar Spinal Fusion: What to Expect at Home. \" Current as of: March 21, 2017 Content Version: 11.4 © 4355-5712 [employer] ([employer] Care instructions adapted under license by [employer] (which disclaims liability or warranty for this information). If you have questions about a medical condition or this instruction, always ask your healthcare professional. [place] [address] any warranty or liability for your use of this information. Patient meets criteria for BUNDLED PAYMENT  
for Care Improvement Initiative Criteria Contact Information for Orthopedic Nurse Navigator:     
ZOË Abebe, RN-BC 
N:[phone] X:[phone] H:[phone] After Hospital Care Plan:  Discharge Instructions XLIF Surgery Dr. Nelly Cruz Patient Name: Zeina Spenser Date of procedure: 2/8/2018  Date of discharge: 2/9/2018 Procedure: Procedure(s): 
L3-4 XLIF WITH ALLOGRAFT AND PLATE (OXYGEN)  PCP: Indira Arceo MD 
 
Follow up appointments -follow up with Dr. Jolie Platt in 2 weeks. Call [phone] to make an appointment as soon as you get home from the hospital. 
 
When to call your Spine Surgeon: 
-Signs of infection-if your incision is red; continues to have drainage; drainage has a foul odor or if you have a persistent fever over 101 degrees for 24 hours 
-Nausea or vomiting, severe headache 
-Loss of bowel or bladder function, inability to urinate 
-Changes in sensation in your arms or legs (numbness, tingling, loss of color) -Increased weakness-greater than before your surgery 
-Severe pain or pain not relieved by medications 
-Signs of a blood clot in your leg-calf pain, tenderness, redness, swelling of lower leg When to call your Primary Care Physician: 
-Concerns about medical conditions such as diabetes, high blood pressure, asthma, congestive heart failure 
-Call if blood sugars are elevated, persistent headache or dizziness, coughing or congestion, constipation or diarrhea, burning with urination, abnormal heart rate When to call 911 and go to the nearest emergency room: 
-Acute onset of chest pain, shortness of breath, difficulty breathing Activity 
-You are going home a well person, be as active as possible. Your only exercise should be walking. Start with short frequent walks and increase your walking distance each day. 
-Limit the amount of time you sit to 20-30 minute intervals. Sitting for prolonged periods of time will be uncomfortable for you following surgery. 
-Do NOT lift anything over 5 pounds 
-Do NOT do any straining, twisting or bending 
-When you are in bed, you may lay on your back or on either side. Do NOT lie on your stomach Brace 
-If you have a back brace, you should wear your brace at all times when you are out of bed. Do not wear the brace while in bed or showering. 
-Remember to always wear a cotton t-shirt underneath your brace. 
-Do not bend or twist when your brace is off. Diet -Resume usual diet; drink plenty of fluids; eat foods high in fiber 
-It is important to have regular bowel movements. Pain medications may cause constipation. You may want to take a stool softener (such as Senokot-S or Colace) to prevent constipation. 
 -If constipation occurs, take a laxative (such as Dulcolax tablets, Milk of Magnesia, or a suppository). Laxatives should only be used if the above preventable measures have failed and you still have not had a bowel movement after three days Driving 
-You may not drive or return to work until instructed by your physician. However, you may ride in the car for short periods of time. Incision Care 
-You may take brief showers but do not run the water run directly onto the wound. After showering or bathing, gently blot the wound dry with a clean, soft towel. 
-Do not rub or apply any lotions or ointments to your incision site.  
-Do not soak or scrub your wound Showering 
-You may shower in approximately 3 days after your surgery.   
-Leave the dressing on during your shower. Do NOT allow the water to run directly onto your dressing. Once you get out of the shower, put on a dry dressing. 
-Reminder- your brace can be removed while showering. Remember to not bend or twist while your brace is off.   
-Do not take a tub bath. Preventing blood clots 
-You have been given T.E.D. stockings to wear. Continue to wear these for 7 days after your discharge. Put them on in the morning and take them off at night.   
-They are used to increase your circulation and prevent blood clots from forming in your legs 
-T. E.D. stockings can be machine washed, temperature not to exceed 160° F (71°C) and machine dried for 15 to 20 minutes, temperature not to exceed 250° F (121°C). Pain management 
-Take pain medication as prescribed; decrease the amount you use as your pain lessens 
-DO not wait until you are in extreme pain to take your medication. -Avoid alcoholic beverages while taking pain medication Pain Medication Safety DO: 
-Read the Medication Guide  
-Take your medicine exactly as prescribed  
-Store your medicine away from children and in a safe place  
-Flush unused medicine down the toilet  
-Call your healthcare provider for medical advice about side effects. You may report side effects to FDA at 3-420-FDA-0443.  
-Please be aware that many medications contain Tylenol. We do not want you to over medicate so please read the information below as a guide. Do not take more than 4 Grams of Tylenol in a 24 hour period. (There are 1000 milligrams in one Gram) Percocet contains 325 mg of Tylenol per tablet (do not take more than 12 tablets in 24 hours) Lortab contains 500 mg of Tylenol per tablet (do not take more than 8 tablets in 24 hours) Norco contains 325 mg of Tylenol per tablet (do not take more than 12 tablets in 24 hours). DO NOT: 
-Do not give your medicine to others  
-Do not take medicine unless it was prescribed for you  
-Do not stop taking your medicine without talking to your healthcare provider  
-Do not break, chew, crush, dissolve, or inject your medicine. If you cannot swallow your medicine whole, talk to your healthcare provider. 
-Do not drink alcohol while taking this medicine 
-Do not take anti-inflammatory medications or aspirin unless instructed by your   physician. Introducing [facility]!    
 [facility] introduces [employer] patient portal. Now you can access parts of your medical record, email your doctor's office, and request medication refills online. 1. In your internet browser, go to [email or web address] 2. Click on the First Time User? Click Here link in the Sign In box. You will see the New Member Sign Up page. 3. Enter your [employer] Access Code exactly as it appears below. You will not need to use this code after youve completed the sign-up process. If you do not sign up before the expiration date, you must request a new code. · [employer] Access Code: [email or web address] Expires: 5/2/2018  1:55 PM 
 
4. Enter the last four digits of your Social Security Number (xxxx) and Date of Birth (mm/dd/yyyy) as indicated and click Submit. You will be taken to the next sign-up page. 5. Create a [employer] ID. This will be your [employer] login ID and cannot be changed, so think of one that is secure and easy to remember. 6. Create a [employer] password. You can change your password at any time. 7. Enter your Password Reset Question and Answer. This can be used at a later time if you forget your password. 8. Enter your e-mail address. You will receive e-mail notification when new information is available in [address] 9. Click Sign Up. You can now view and download portions of your medical record. 10. Click the Download Summary menu link to download a portable copy of your medical information. If you have questions, please visit the Frequently Asked Questions section of the [employer] website. Remember, [employer] is NOT to be used for urgent needs. For medical emergencies, dial 911. Now available from your iPhone and Android! Providers Seen During Your Hospitalization Provider Specialty Primary office phone Aracely Muñoz MD Neurosurgery [phone] Your Primary Care Physician (PCP) Primary Care Physician Office Phone Office Fax Dagoberto Sow [phone] You are allergic to the following Allergen Reactions Amoxicillin Rash Hydrocodone Hives Penicillins Hives Percocet (Oxycodone-Acetaminophen) Itching Recent Documentation Height Weight BMI Smoking Status 1.759 m 86.6 kg 28 kg/m2 Never Smoker Emergency Contacts Name Discharge Info Relation Home Work Mobile Mat Hwang DISCHARGE CAREGIVER [3] Spouse [3] [phone] Patient Belongings The following personal items are in your possession at time of discharge: 
  Dental Appliances: None  Visual Aid: None             Clothing:  (clothes to pacu) Please provide this summary of care documentation to your next provider. Signatures-by signing, you are acknowledging that this After Visit Summary has been reviewed with you and you have received a copy. Patient Signature:  ____________________________________________________________ Date:  ____________________________________________________________  
  
[facility] Provider Signature:  ____________________________________________________________ Date:  ____________________________________________________________

## 2018-02-08 NOTE — OP NOTES
[address]   ACUTE CARE OP NOTE    Natalio Robles  MR#: [id number]  : 1935  ACCOUNT #: [de-identified]   DATE OF SERVICE: 2018    PREOPERATIVE DIAGNOSIS:  L3-4 spondylolisthesis and stenosis. POSTOPERATIVE DIAGNOSIS:  L3-4 spondylolisthesis and stenosis. PROCEDURE:  Lumbar 3-4 extreme lateral interbody fusion with instrumentation via a left transpsoas approach, use of intraoperative spinal cord monitoring and real-time EMG. SURGEON:  Ruben Andrea MD     ASSISTANT:  Jamie Bucio    ANESTHESIA:  General endotracheal anesthesia. BLOOD LOSS:  30 mL. COMPLICATIONS:  None. SPECIMENS:  None. IMPLANTS:  .    OPERATIVE INDICATIONS:  An 81year-old gentleman with severe progressive back pain, intermittent neurogenic claudication. MRI showed a grade I spondylolisthesis at L3-4 with some moderate stenosis. After discussing treatment options and failing all nonoperative measures, informed consent was obtained. OPERATION IN DETAIL:  The patient was taken to the operating room and placed under general endotracheal anesthesia. All necessary lines and monitors were placed. He was given appropriate dose of IV antibiotics. SCDs and Rutherford were placed. He was hooked up for neurologic monitoring, which was performed by neurophysiology throughout the case with no detrimental changes. He was placed lateral left side up. All pressure points were padded. The bed was broken slightly over the iliac crest.  He was secured to the bed carefully with heavy tape. I used fluoroscopy to kristin my landmarks and then a small linear incision was made in the left flank after he was prepped and draped sterilely. The incision was carried down with Bovie electrocautery through the subcutaneous tissue. The muscle was divided along its fibers. A self-retaining retractor was placed.   I then opened the transversus fascia down to the retroperitoneal space, identified retroperitoneal fat. With palpable sweeping motions with my hand as well as a sponge stick, I swept away the peritoneum anteriorly to expose the psoas muscle. I then used the NuVasive minimally invasive dilator set, dilating over the psoas muscle. On my first pass I did stimulate the nerves a little bit too high. I therefore repositioned my dilators more anteriorly in the disk space. I placed the dilators progressively over the L3-4 disk space and stimulated 360 degrees with adequate stimulation, no evidence of any crossing nerve roots. I then used the [email or web address] minimally invasive retractor, placed this over the dilators and locked this into position. I opened this slightly, placed the self-holding retractor lights and removed the dilators, visualizing the disk space. I stimulated without any crossing nerve fibers. The holding claudio was placed at about the 30-40 yard line of the disk space. This was all done under fluoroscopy. The retractor was opened further. I then performed an annulotomy, removed the disk from the lateral approach. I then cleaned out the disk space with the NuVasive interbody set and decorticated the endplates. I then progressively dilated the disk space and sized it appropriately. I ultimately used a CoRoent PEEK lordotic spacer measuring 10 mm in height. This was placed in the disk space and all the way across to the other side. This fit well with good apposition, snug fit and appeared to be in good position on AP and lateral fluoro. I then deployed this. I then placed a 2-hole lateral plate with 45 mm screws over L3 and L4. I placed 45 mm screws at L3 and L4 under fluoroscopic guidance, attaching the plate to the lateral vertebral body. There were no detrimental neurologic changes. The patient was stable. I then locked down the screws and the plate. The retractors were removed and the wound was copiously irrigated with antibiotic solution.   Hemostasis was achieved and the wound was then closed using 0 Vicryl to close the muscular and fascial layers and 2-0 Vicryl in the deep dermal layer, running 4-0 Monocryl in a subcuticular fashion. Wounds were cleaned, dried, dressed with sterile dressing. The patient was rolled over, extubated, and taken to recovery in stable condition. MD Yuri Calabrese / Anthony Levine  D: 02/08/2018 11:03     T: 02/08/2018 13:32  JOB #: [id number]  CC: Teri Roche MD

## 2018-02-08 NOTE — ROUTINE PROCESS
TRANSFER - OUT REPORT:    Verbal report given to Vu RN(name) on Nohemy Hester  being transferred to [facility](unit) for routine post - op       Report consisted of patients Situation, Background, Assessment and   Recommendations(SBAR). Time Pre op antibiotic [id number]  Anesthesia Stop time: 0803  Rutherford Present on Transfer to floor:yes  Order for Rutherford on Chart:yes  Discharge Prescriptions with Chart:no    Information from the following report(s) SBAR, Kardex, OR Summary, Procedure Summary and Cardiac Rhythm nsr was reviewed with the receiving nurse. Opportunity for questions and clarification was provided. Is the patient on 02? YES       L/Min 2           Is the patient on a monitor? NO    Is the nurse transporting with the patient? NO    Surgical Waiting Area notified of patient's transfer from PACU?  YES      The following personal items collected during your admission accompanied patient upon transfer:   Dental Appliance: Dental Appliances: None  Vision:    Hearing Aid:    Jewelry:    Clothing: Clothing:  (clothes to [employer])  Other Valuables:    Valuables sent to safe:

## 2018-02-08 NOTE — PROGRESS NOTES
Neurosurgery Spine Progress Note  Kaye Salgado, AGACNP-BC  Work Cell: [phone]    Post Op Day: Day of Surgery    February 8, 2018 5:04 PM     Sloane Ledezma    HPI:    Sloane Ledezma is a 81 y.o. male with a PMH of HTN presented to Dr. July Vaz clinic with severe low back pain. The pain is exacerbated by walking and rolling over in bed. Walking for any amount of time causes severe pain in bilateral legs, making him feel weak or like his legs are made of rubber. His pain has not been relieved by conventional methods, therefore he was consented to undergo surgical intervention. He is now POD 0 from a Procedure(s):  L3-4 XLIF WITH ALLOGRAFT AND PLATE (OXYGEN)    Subjective: Mr. Vikram Martinez was resting comfortably when I entered the room. He awakened to voice and was pleasantly surprised that he had slept most of the afternoon. He c/o 6/10 pain to his back at this time. He denies any numbness/tingling. He has no pain to his legs. He denies CP, SOB, abdominal pain, and N/V. Objective:   General: alert, cooperative, no distress. EENT: EOMI. Anicteric sclerae. Oral mucous moist, oropharynx benign  Resp: CTA bilaterally. No wheezing/rhonchi/rales. No accessory muscle use  CV: Regular rhythm, normal rate, no murmurs, gallops, rubs. No cyanosis or clubbing. No edema appreciated in the extremities. Gastrointestinal:  Soft, non-tender. Hypoactive bowel sounds, no hepatosplenomegaly  Neurological: Follows commands. Speech clear. Affect normal.  JOEL. Strength 5/5 in BUE and 4+/5 distally BLE, proximal testing deferred at this time due to pt positioning. Sensation stable. Musculoskeletal:  Calves soft, supple, non-tender upon palpation or with passive stretch. Psych: Good insight. Not anxious nor agitated. Skin: Incision - clean, dry and intact. No significant surrounding erythema or swelling.     Dressing: clean, dry, and intact       Vital Signs:    Patient Vitals for the past 8 hrs:   BP Temp Pulse Resp SpO2   18 1556 123/73 98.8 °F (37.1 °C) 71 14 95 %   18 1502 123/68 98 °F (36.7 °C) 85 16 93 %   18 1417 115/65 98.8 °F (37.1 °C) 71 16 98 %   18 1241 149/65 98.2 °F (36.8 °C) 88 16 96 %   18 1200 135/71 97.9 °F (36.6 °C) 85 14 94 %   18 1145 130/73 - 91 14 95 %   18 1130 130/74 - 86 11 91 %   18 1115 140/82 - 90 19 98 %   18 1100 144/74 - 86 13 90 %   18 1045 133/76 - 82 10 99 %   18 1030 128/64 - 88 12 98 %   18 1015 139/85 - 84 14 100 %   18 1010 145/82 - 83 18 100 %   18 1005 139/76 97.9 °F (36.6 °C) 89 16 98 %     Temp (24hrs), Av.2 °F (36.8 °C), Min:97.8 °F (36.6 °C), Max:98.8 °F (37.1 °C)      Intake/Output:  701 -  1900  In: 1900 [I.V.:1900]  Out: 215 [Urine:200]       Pain Control:   Pain Assessment  Pain Scale 1: Numeric (0 - 10)  Pain Intensity 1: 10  Pain Intervention(s) 1: Medication (see MAR)    LAB:    No results for input(s): HCT, HGB, HCTEXT, HGBEXT in the last 72 hours. Lab Results   Component Value Date/Time    Sodium 142 2018 02:51 PM    Potassium 4.4 2018 02:51 PM    Chloride 105 2018 02:51 PM    CO2 29 2018 02:51 PM    Glucose 119 (H) 2018 02:51 PM    BUN 17 2018 02:51 PM    Creatinine 1.20 2018 02:51 PM    Calcium 8.8 2018 02:51 PM       PT/OT:   Gait:                    Assessment/Plan:    1. Day of Surgery Procedure(s):  L3-4 XLIF WITH ALLOGRAFT AND PLATE (OXYGEN)   -PT/OT to evaluate tomorrow   -Pain- continue scheduled Toradol for today, PRN Dilaudid PO/IV   -Voiding- sauceda, monitor I's and O's   -Incentive Spirometer every hour while awake   -Tolerating diet- advance as tolerated, denies N/V at this time   -VTE Prophylaxes - TEDS & SCDs    2. HTN, controlled   -Continue Cozaar and HCTZ       Discharge To:   To be determined by PT/OT tomorrow    Signed By: Maddie Sandoval

## 2018-02-08 NOTE — IP AVS SNAPSHOT
[address] 
[phone] Patient: Raul Alfaro MRN: [id number] :1935 A check kristin indicates which time of day the medication should be taken. My Medications START taking these medications Instructions Each Dose to Equal  
 Morning Noon Evening Bedtime  
 cyclobenzaprine 10 mg tablet Commonly known as:  FLEXERIL Your last dose was: Your next dose is: Take 1 Tab by mouth three (3) times daily as needed for Muscle Spasm(s). Indications: Muscle Spasm  
 10 mg HYDROmorphone 2 mg tablet Commonly known as:  DILAUDID Your last dose was: Your next dose is: Take 1 Tab by mouth every four (4) hours as needed. Max Daily Amount: 12 mg.  
 2 mg  
    
   
   
   
  
 senna-docusate 8.6-50 mg per tablet Commonly known as:  Roya Spruce Your last dose was: Your next dose is: Take 1 Tab by mouth two (2) times a day. 1 Tab CONTINUE taking these medications Instructions Each Dose to Equal  
 Morning Noon Evening Bedtime  
 aspirin 81 mg chewable tablet Your last dose was: Your next dose is: Take 81 mg by mouth daily. 81 mg  
    
   
   
   
  
 potassium chloride SR 10 mEq tablet Commonly known as:  KLOR-CON 10 Your last dose was: Your next dose is: Take  by mouth daily. valsartan-hydroCHLOROthiazide 80-12.5 mg per tablet Commonly known as:  DIOVAN-HCT Your last dose was: Your next dose is: Take 1 Tab by mouth daily. 1 Tab VITAMIN D3 2,000 unit Tab Generic drug:  cholecalciferol (vitamin D3) Your last dose was: Your next dose is: Take  by mouth daily. STOP taking these medications ALEVE 220 mg tablet Generic drug:  naproxen sodium Where to Get Your Medications Information on where to get these meds will be given to you by the nurse or doctor. ! Ask your nurse or doctor about these medications  
  cyclobenzaprine 10 mg tablet HYDROmorphone 2 mg tablet  
 senna-docusate 8.6-50 mg per tablet

## 2018-02-09 PROCEDURE — 97161 PT EVAL LOW COMPLEX 20 MIN: CPT

## 2018-02-09 PROCEDURE — G8988 SELF CARE GOAL STATUS: HCPCS

## 2018-02-09 PROCEDURE — 74011250637 HC RX REV CODE- 250/637: Performed by: NURSE PRACTITIONER

## 2018-02-09 PROCEDURE — G8987 SELF CARE CURRENT STATUS: HCPCS

## 2018-02-09 PROCEDURE — 97116 GAIT TRAINING THERAPY: CPT

## 2018-02-09 PROCEDURE — G8989 SELF CARE D/C STATUS: HCPCS

## 2018-02-09 PROCEDURE — 74011250637 HC RX REV CODE- 250/637: Performed by: NEUROLOGICAL SURGERY

## 2018-02-09 PROCEDURE — 65270000032 HC RM SEMIPRIVATE

## 2018-02-09 PROCEDURE — 97165 OT EVAL LOW COMPLEX 30 MIN: CPT

## 2018-02-09 PROCEDURE — G8979 MOBILITY GOAL STATUS: HCPCS

## 2018-02-09 PROCEDURE — 74011250636 HC RX REV CODE- 250/636: Performed by: NEUROLOGICAL SURGERY

## 2018-02-09 PROCEDURE — G8978 MOBILITY CURRENT STATUS: HCPCS

## 2018-02-09 RX ORDER — AMOXICILLIN 250 MG
1 CAPSULE ORAL 2 TIMES DAILY
Qty: 60 TAB | Refills: 0 | Status: SHIPPED | OUTPATIENT
Start: 2018-02-09

## 2018-02-09 RX ORDER — POLYETHYLENE GLYCOL 3350 17 G/17G
17 POWDER, FOR SOLUTION ORAL 2 TIMES DAILY
Status: DISCONTINUED | OUTPATIENT
Start: 2018-02-09 | End: 2018-02-10 | Stop reason: HOSPADM

## 2018-02-09 RX ORDER — HYDROMORPHONE HYDROCHLORIDE 2 MG/1
2 TABLET ORAL
Qty: 60 TAB | Refills: 0 | Status: SHIPPED | OUTPATIENT
Start: 2018-02-09

## 2018-02-09 RX ORDER — LORATADINE 10 MG/1
5 TABLET ORAL 2 TIMES DAILY
Status: DISCONTINUED | OUTPATIENT
Start: 2018-02-09 | End: 2018-02-10 | Stop reason: HOSPADM

## 2018-02-09 RX ADMIN — LORATADINE 5 MG: 10 TABLET ORAL at 12:14

## 2018-02-09 RX ADMIN — SODIUM CHLORIDE 250 ML: 900 INJECTION, SOLUTION INTRAVENOUS at 08:48

## 2018-02-09 RX ADMIN — KETOROLAC TROMETHAMINE 15 MG: 30 INJECTION, SOLUTION INTRAMUSCULAR at 06:33

## 2018-02-09 RX ADMIN — Medication 10 ML: at 14:00

## 2018-02-09 RX ADMIN — KETOROLAC TROMETHAMINE 15 MG: 30 INJECTION, SOLUTION INTRAMUSCULAR at 00:34

## 2018-02-09 RX ADMIN — HYDROMORPHONE HYDROCHLORIDE 2 MG: 2 TABLET ORAL at 06:36

## 2018-02-09 RX ADMIN — SODIUM CHLORIDE 125 ML/HR: 900 INJECTION, SOLUTION INTRAVENOUS at 06:33

## 2018-02-09 RX ADMIN — POLYETHYLENE GLYCOL 3350 17 G: 17 POWDER, FOR SOLUTION ORAL at 18:10

## 2018-02-09 RX ADMIN — Medication 10 ML: at 06:36

## 2018-02-09 RX ADMIN — POTASSIUM CHLORIDE 10 MEQ: 750 TABLET, EXTENDED RELEASE ORAL at 08:46

## 2018-02-09 RX ADMIN — DOCUSATE SODIUM AND SENNOSIDES 1 TABLET: 8.6; 5 TABLET, FILM COATED ORAL at 08:46

## 2018-02-09 RX ADMIN — Medication 10 ML: at 22:00

## 2018-02-09 RX ADMIN — LORATADINE 5 MG: 10 TABLET ORAL at 18:10

## 2018-02-09 NOTE — PROGRESS NOTES
Orthopedic Spine Progress Note  Keyona Gamboa, AGACNP-BC  Work Cell: [phone]    Post Op Day: 1 Day Post-Op    February 9, 2018 11:44 AM     Jacobo Yao    HPI:    Jacobo Yao is a 81 y.o. male with prior medical history significant for hypertension and prostate cancer s/p prostatectomy in 2002. He presented to Dr. Tal Rodriguez clinic with complaints of progressive back and leg pain, unresponsive to conservative treatment. Symptoms consistent with neurogenic claudication. His legs feel weak and rubbery. He had a L3-4 SHIVANI at [facility] which provided minimal relief in his symptoms. 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 After failing conservative treatment, Mr. Neil Johnson consented to undergo a  Procedure(s):  L3-4 XLIF WITH ALLOGRAFT AND PLATE (OXYGEN)    Subjective:    Pt appears comfortable in bed. Reports some left leg spasms from the SCDs. Incision open to air, clean and dry,dermabond in place. No dysesthesias. He denies headache, dizziness, cp, sob, abd pain, fever, chills, or n/v.     Objective:   General: alert, cooperative, no distress. EENT: EOMI. Anicteric sclerae. Oral mucous moist, oropharynx benign  Resp: CTA bilaterally. No wheezing/rhonchi/rales. No accessory muscle use  CV: Regular rhythm, normal rate, no murmurs, gallops, rubs. No cyanosis or clubbing. No edema appreciated in the extremities. Gastrointestinal:  Soft, non-tender. normoactive bowel sounds, no hepatosplenomegaly  Neurological: Follows commands. Speech clear. Affect normal.  JOEL. Strength 5/5 in BUE and BLE. Sensation stable. Musculoskeletal:  Calves soft, supple, non-tender upon palpation or with passive stretch. Psych: Good insight. Not anxious nor agitated. Skin: Incision - clean, dry and intact. No significant surrounding erythema or swelling.     Dressing: clean, dry, and intact       Vital Signs:    Patient Vitals for the past 8 hrs:   BP Temp Pulse Resp SpO2 18 1125 151/73 - - - -   18 1233 124/65 - - - -   18 0813 115/63 98.6 °F (37 °C) 82 18 96 %     Temp (24hrs), Av.3 °F (36.8 °C), Min:97.9 °F (36.6 °C), Max:98.8 °F (37.1 °C)      Intake/Output:      1901 -  07  In: 1900 [I.V.:1900]  Out: 615 [Urine:600]    Pain Control:   Pain Assessment  Pain Scale 1: Numeric (0 - 10)  Pain Intensity 1: 4  Pain Onset 1: postop  Pain Location 1: Back  Pain Orientation 1: Left  Pain Description 1: Aching  Pain Intervention(s) 1: Declines, Emotional support, Repositioned, Rest    LAB:    No results for input(s): HCT, HGB, HCTEXT, HGBEXT in the last 72 hours. Lab Results   Component Value Date/Time    Sodium 142 2018 02:51 PM    Potassium 4.4 2018 02:51 PM    Chloride 105 2018 02:51 PM    CO2 29 2018 02:51 PM    Glucose 119 (H) 2018 02:51 PM    BUN 17 2018 02:51 PM    Creatinine 1.20 2018 02:51 PM    Calcium 8.8 2018 02:51 PM       PT/OT:   Gait:  Gait  Base of Support: Widened  Speed/Nereyda: Fluctuations  Gait Abnormalities: Decreased step clearance, Trunk sway increased  Ambulation - Level of Assistance: Contact guard assistance  Distance (ft): 300 Feet (ft)  Assistive Device: Brace/Splint, Gait belt, Other (comment) (occasionally reaches for rail on wall for support)                 Assessment/Plan:    1. 1 Day Post-Op Procedure(s):  L3-4 XLIF WITH ALLOGRAFT AND PLATE (OXYGEN)   -Continue PT/OT   -Pain management with PRN oxycodone.   -Pull sauceda   -Incentive Spirometer   -Tolerating diet. Bowel regimen   -VTE Prophylaxes - TEDS & SCDs    2. Hypertension, chronic   -BP acceptable   -Cont home Cozaar-HCTZ         Discharge To:   Home likely tomorrow if positive progression with PT    Signed By: Elizabeth Bear NP

## 2018-02-09 NOTE — PROGRESS NOTES
Problem: Mobility Impaired (Adult and Pediatric)  Goal: *Acute Goals and Plan of Care (Insert Text)  Physical Therapy Goals  Initiated 2/9/2018    1. Patient will move from supine to sit and sit to supine  and roll side to side in bed with independence within 4 days. 2. Patient will perform sit to stand with independence within 4 days. 3. Patient will ambulate with independence for 300 feet with the least restrictive device within 4 days. 4. Patient will verbalize and demonstrate understanding of spinal precautions (No bending, lifting greater than 5 lbs, or twisting; log-roll technique; frequent repositioning as instructed) within 4 days. physical Therapy TREATMENT  Patient: Evangelista Farmer (28 y.o. male)  Date: 2/9/2018  Diagnosis: L3-4 SPONDELOLYTHESIS  Lumbar stenosis <principal problem not specified>  Procedure(s) (LRB):  L3-4 XLIF WITH ALLOGRAFT AND PLATE (OXYGEN) (N/A) 1 Day Post-Op  Precautions: Back (brace when OOB)  Chart, physical therapy assessment, plan of care and goals were reviewed. ASSESSMENT:  Patient with improved mobility this afternoon, though continues to need cues for safety and adherence to back precautions throughout. Patient impulsive. Patient independent in donning brace, ambulated 300 ft without AD with SBA. Patient with improved stability, not reaching for rails this afternoon. Patient returned to bed at end of session per patient request. Recommend HHPT at discharge for continued safety education, mobility progression, and education on adherence to back precautions in home environment. Wife available to assist at discharge as well. Will follow up tomorrow to progress mobility.    Progression toward goals:  [x]      Improving appropriately and progressing toward goals  []      Improving slowly and progressing toward goals  []      Not making progress toward goals and plan of care will be adjusted     PLAN:  Patient continues to benefit from skilled intervention to address the above impairments. Continue treatment per established plan of care. Discharge Recommendations:  Home Health  Further Equipment Recommendations for Discharge:  none     SUBJECTIVE:   Patient stated I just got back in bed, but we can go walk.    The patient stated 2/3 back precautions. Reviewed all 3 with patient. OBJECTIVE DATA SUMMARY:   Critical Behavior:  Neurologic State: Alert  Orientation Level: Oriented X4  Cognition: Impulsive  Safety/Judgement: Decreased awareness of need for safety  Functional Mobility Training:  Bed Mobility:  Log Rolling: Stand-by asssistance  Supine to Sit: Stand-by asssistance  Sit to Supine: Stand-by asssistance           Brace donned with  supervision/set-up   Transfers:  Sit to Stand: Stand-by asssistance  Stand to Sit: Stand-by asssistance                             Balance:  Sitting: Intact  Standing: Intact; Without support  Standing - Static: Good  Standing - Dynamic : Fair  Ambulation/Gait Training:  Distance (ft): 300 Feet (ft)  Assistive Device: Brace/Splint;Gait belt  Ambulation - Level of Assistance: Stand-by asssistance     Gait Description (WDL): Exceptions to WDL  Gait Abnormalities: Decreased step clearance;Trunk sway increased        Base of Support: Widened     Speed/Nereyda: Fluctuations          Pain:  Pain Scale 1: Numeric (0 - 10)  Pain Intensity 1: 4  Pain Location 1: Back  Pain Orientation 1: Left  Pain Description 1: Aching  Pain Intervention(s) 1: Declines; Emotional support;Repositioned; Rest  Activity Tolerance:   VSS  Please refer to the flowsheet for vital signs taken during this treatment.   After treatment:   []  Patient left in no apparent distress sitting up in chair  [x]  Patient left in no apparent distress in bed  [x]  Call bell left within reach  [x]  Nursing notified  []  Caregiver present  []  Bed alarm activated    COMMUNICATION/COLLABORATION:   The patients plan of care was discussed with: Registered Nurse    Oral Zimmer, PT   Time Calculation: 13 mins

## 2018-02-09 NOTE — PROGRESS NOTES
Occupational Therapy EVALUATION/discharge  Patient: Cindy Euceda (89 y.o. 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 ADLs limited by typical s/p back sx. Patient to discharge home with wife A PRN. Education provided. Recommend with nursing patient to complete as able in order to maintain strength, endurance and independence: ADLs with supervision/setup, OOB to chair 3x/day and mobilizing to the bathroom for toileting with assist. Thank you for your assistance. Further skilled acute occupational therapy is not indicated at this time. Discharge Recommendations: None  Further Equipment Recommendations for Discharge: none noted      SUBJECTIVE:   Patient stated My wife can help me.  Wife stated \"no I can't\" and gave a wink. OBJECTIVE DATA SUMMARY:   HISTORY:   Past Medical History:   Diagnosis Date    Arthritis     Bleeds easily ([address]     Chronic pain     Essential hypertension     Lumbar stenosis with neurogenic claudication 2/8/2018    Skin cancer     BCCA; PROSTATE     Past Surgical History:   Procedure Laterality Date    [facility]    HX PROSTATECTOMY  2002       Prior Level of Function/Environment/Context: independent with all ADLs, vacuuming and yard work. Wife had back sx 1 year ago and has not been able to resume vacuuming. They stated will obtain a  in meantime of patient being able to perform again. All items are raised height to prevent bending. Has reacher and slide on shoes.     Occupations in which the patient is/was successful, what are the barriers preventing that success:   Performance Patterns (routines, roles, habits, and rituals):   Personal Interests and/or values:   Expanded or extensive additional review of patient history:     Home Situation  Home Environment: Private residence  # Steps to Enter: 1  One/Two Story Residence: One story  Living Alone: No  Support Systems: Family member(s), Spouse/Significant Other/Partner  Patient Expects to be Discharged to[de-identified] Private residence  Current DME Used/Available at Home: Brace/Splint, Walker, rolling  [x]  Right hand dominant   []  Left hand dominant    EXAMINATION OF PERFORMANCE DEFICITS:  Cognitive/Behavioral Status:  Neurologic State: Alert  Orientation Level: Oriented X4  Cognition: Impulsive  Perception: Appears intact  Perseveration: No perseveration noted  Safety/Judgement: Decreased awareness of need for safety    Skin: intact as seen    Edema: intact as seen    Hearing: Auditory  Auditory Impairment: Hard of hearing, bilateral    Vision/Perceptual:                                     Range of Motion:    AROM: Within functional limits  PROM: Within functional limits                      Strength:    Strength: Generally decreased, functional                Coordination:  Coordination: Within functional limits            Tone & Sensation:    Tone: Normal  Sensation: Intact                      Balance:  Sitting: Intact  Standing: Impaired; Without support  Standing - Static: Good  Standing - Dynamic : Fair    Functional Mobility and Transfers for ADLs:  Bed Mobility:  Rolling: Stand-by asssistance  Supine to Sit: Contact guard assistance  Sit to Supine:  (not assessed, OOB to chair)    Transfers:  Sit to Stand: Contact guard assistance  Stand to Sit: Contact guard assistance    ADL Assessment:             Patient stating completing ADLs on own. Mock BM reach for hygiene without A. Tailor sitting with limitation to reach toes, states wears slide on shoes. Patient and wife recalled all back techniques from her sx a year ago. Lower Body Dressing:  Moderate assistance                     ADL Intervention and task modifications:     Patient instructed and indicated understanding the benefits of maintaining activity tolerance, functional mobility, and independence with self care tasks during acute stay  to ensure safe return home and to baseline. Encouraged patient to increase frequency and duration OOB. Patient recalled without cues not sitting longer than 30 mins without marching/walking with staff, be out of bed for all meals, perform daily ADLs (as approved by RN/MD regarding bathing etc), and performing functional mobility to/from bathroom. Dressing lower body: Patient instructed to don brace first and on the benefits to remain seated to don all clothing to increase independence with precautions and pain management. Patient instruction and indicated understanding to not strain i.e. holding breath to bear down during a bowel movement, lifting/activity, and sexual activity. Standing: . Patient instructed to increase amount of time standing in order to increase independence and tolerance with ADLs. During prolonged standing, can open cabinet door or place foot on stool to decrease spinal pressure/increase pain. Cognitive Retraining  Safety/Judgement: Decreased awareness of need for safety    Therapeutic Exercise:  Handout provided. Functional Measure:  Barthel Index:    Bathin  Bladder: 0  Bowels: 10  Groomin  Dressin  Feeding: 10  Mobility: 10  Stairs: 0  Toilet Use: 5  Transfer (Bed to Chair and Back): 10  Total: 55       Barthel and G-code impairment scale:  Percentage of impairment CH  0% CI  1-19% CJ  20-39% CK  40-59% CL  60-79% CM  80-99% CN  100%   Barthel Score 0-100 100 99-80 79-60 59-40 20-39 1-19   0   Barthel Score 0-20 20 17-19 13-16 9-12 5-8 1-4 0      The Barthel ADL Index: Guidelines  1. The index should be used as a record of what a patient does, not as a record of what a patient could do.   2. The main aim is to establish degree of independence from any help, physical or verbal, however minor and for whatever reason. 3. The need for supervision renders the patient not independent. 4. A patient's performance should be established using the best available evidence. Asking the patient, friends/relatives and nurses are the usual sources, but direct observation and common sense are also important. However direct testing is not needed. 5. Usually the patient's performance over the preceding 24-48 hours is important, but occasionally longer periods will be relevant. 6. Middle categories imply that the patient supplies over 50 per cent of the effort. 7. Use of aids to be independent is allowed. Dane Rather., Barthel, D.W. ([id number]). Functional evaluation: the Barthel Index. [facility] (14)2. Tylene Silver leyda [place], J.J.M.F, Danielle Johnson., Marciano Larios., [place], [address] (1999). Measuring the change indisability after inpatient rehabilitation; comparison of the responsiveness of the Barthel Index and Functional [place] Measure. Journal of Neurology, Neurosurgery, and Psychiatry, 66(4), 155-933. Eduardo Way, N.J.A, RANDY Layne, & Rubio Dawson, M.A. (2004.) Assessment of post-stroke quality of life in cost-effectiveness studies: The usefulness of the Barthel Index and the EuroQoL-5D. Quality of Life Research, 13, 358-67         G codes: In compliance with CMSs Claims Based Outcome Reporting, the following G-code set was chosen for this patient based on their primary functional limitation being treated: The outcome measure chosen to determine the severity of the functional limitation was the Barthel Index with a score of 55/100 which was correlated with the impairment scale. ?  Self Care:     - CURRENT STATUS: CK - 40%-59% impaired, limited or restricted    - GOAL STATUS: CK - 40%-59% impaired, limited or restricted    - D/C STATUS:  CK - 40%-59% impaired, limited or restricted     Pain Scale 1: Numeric (0 - 10)  Pain Intensity 1: 4  Pain Location 1: Back  Pain Orientation 1: Left  Pain Description 1: Aching  Pain Intervention(s) 1: Declines; Emotional support;Repositioned; Rest  Activity Tolerance:   VSS  Please refer to the flowsheet for vital signs taken during this treatment. After treatment:   [x]  Patient left in no apparent distress sitting up in chair  []  Patient left in no apparent distress in bed  [x]  Call bell left within reach  [x]  Nursing notified  [x]  Caregiver present  []  Bed alarm activated    COMMUNICATION/EDUCATION:   Communication/Collaboration:  [x]      Home safety education was provided and the patient/caregiver indicated understanding. [x]      Patient/family have participated as able and agree with findings and recommendations. []      Patient is unable to participate in plan of care at this time.   Findings and recommendations were discussed with: Physical Therapist and Registered Nurse    Karie Garcia  Time Calculation: 8 mins

## 2018-02-09 NOTE — PROGRESS NOTES
Problem: Falls - Risk of  Goal: *Absence of Falls  Document Alesha Fall Risk and appropriate interventions in the flowsheet.    Outcome: Progressing Towards Goal  Fall Risk Interventions:  Mobility Interventions: Communicate number of staff needed for ambulation/transfer, OT consult for ADLs, Patient to call before getting OOB, PT Consult for mobility concerns, PT Consult for assist device competence, Strengthening exercises (ROM-active/passive), Utilize walker, cane, or other assitive device, Utilize gait belt for transfers/ambulation         Medication Interventions: Evaluate medications/consider consulting pharmacy, Patient to call before getting OOB, Teach patient to arise slowly, Utilize gait belt for transfers/ambulation    Elimination Interventions: Call light in reach, Elevated toilet seat, Toilet paper/wipes in reach, Toileting schedule/hourly rounds, Urinal in reach

## 2018-02-09 NOTE — PROGRESS NOTES
Problem: Mobility Impaired (Adult and Pediatric)  Goal: *Acute Goals and Plan of Care (Insert Text)  Physical Therapy Goals  Initiated 2/9/2018    1. Patient will move from supine to sit and sit to supine  and roll side to side in bed with independence within 4 days. 2. Patient will perform sit to stand with independence within 4 days. 3. Patient will ambulate with independence for 300 feet with the least restrictive device within 4 days. 4. Patient will verbalize and demonstrate understanding of spinal precautions (No bending, lifting greater than 5 lbs, or twisting; log-roll technique; frequent repositioning as instructed) within 4 days. physical Therapy EVALUATION  Patient: Julio Macedo (60 y.o. 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 Patient independent prior, lives with wife, admits to \"furniture surfing\" occasionally at home. Patient in bed upon arrival and eager to ambulate, says he is frustrated he isn't going home today. Patient able to perform bed mobility via logroll with SBA/CGA and cues for proper sequencing. Patient then ambulated 300 ft without AD with CGA, occasionally reaches for rail on wall for support though gait steady. Patient left up in chair at end of session. Reviewed back precautions, sitting restriction, and wear schedule for brace. Anticipate patient will be safe for discharge home with wife and HHPT for home safety evaluation. Will follow up this afternoon for BID treatment session.     Patient will benefit from skilled intervention to address the above impairments. Patients rehabilitation potential is considered to be Good  Factors which may influence rehabilitation potential include:   [x]         None noted  []         Mental ability/status  []         Medical condition  []         Home/family situation and support systems  []         Safety awareness  []         Pain tolerance/management  []         Other:      PLAN :  Recommendations and Planned Interventions:  [x]           Bed Mobility Training             []    Neuromuscular Re-Education  [x]           Transfer Training                   []    Orthotic/Prosthetic Training  [x]           Gait Training                         []    Modalities  [x]           Therapeutic Exercises           []    Edema Management/Control  [x]           Therapeutic Activities            [x]    Patient and Family Training/Education  []           Other (comment):    Frequency/Duration: Patient will be followed by physical therapy  twice daily to address goals. Discharge Recommendations: Home Health  Further Equipment Recommendations for Discharge: none     SUBJECTIVE:   Patient stated I'm trying not to be frustrated. Everything has been so great so far until today. They wanted me to have a bowel movement while they were making my bed and I can't do that.     OBJECTIVE DATA SUMMARY:   HISTORY:    Past Medical History:   Diagnosis Date    Arthritis     Bleeds easily ([address]     Chronic pain     Essential hypertension     Lumbar stenosis with neurogenic claudication 2/8/2018    Skin cancer     BCCA; PROSTATE     Past Surgical History:   Procedure Laterality Date    [facility]    HX PROSTATECTOMY  2002     Prior Level of Function/Home Situation: independent, lives with wife, admits to \"furniture surfing\" sometimes at home   Personal factors and/or comorbidities impacting plan of care:     Home Situation  Home Environment: Private residence  # Steps to Enter: 1  One/Two Story Residence: [facility] story  Living Alone: No  Support Systems: Family member(s), Spouse/Significant Other/Partner  Patient Expects to be Discharged to[de-identified] Private residence  Current DME Used/Available at Home: Brace/Splint, Walker, rolling    EXAMINATION/PRESENTATION/DECISION MAKING:   Critical Behavior:  Neurologic State: Alert  Orientation Level: Oriented X4  Cognition: Impulsive  Safety/Judgement: Decreased awareness of need for safety  Hearing: Auditory  Auditory Impairment: Hard of hearing, bilateral  Skin:  Incision intact left side flank  Edema: none   Range Of Motion:  AROM: Within functional limits           PROM: Within functional limits           Strength:    Strength: Generally decreased, functional                    Tone & Sensation:   Tone: Normal              Sensation: Intact               Coordination:  Coordination: Within functional limits  Vision:      Functional Mobility:  Bed Mobility:  Rolling: Stand-by asssistance  Supine to Sit: Contact guard assistance  Sit to Supine:  (not assessed, OOB to chair)     Transfers:  Sit to Stand: Contact guard assistance  Stand to Sit: Contact guard assistance                       Balance:   Sitting: Intact  Standing: Impaired; Without support  Standing - Static: Good  Standing - Dynamic : Fair  Ambulation/Gait Training:  Distance (ft): 300 Feet (ft)  Assistive Device: Brace/Splint;Gait belt; Other (comment) (occasionally reaches for rail on wall for support)  Ambulation - Level of Assistance: Contact guard assistance     Gait Description (WDL): Exceptions to WDL  Gait Abnormalities: Decreased step clearance;Trunk sway increased        Base of Support: Widened     Speed/Nereyda: Fluctuations            Functional Measure:  Barthel Index:    Bathin  Bladder: 0  Bowels: 10  Groomin  Dressin  Feeding: 10  Mobility: 10  Stairs: 0  Toilet Use: 5  Transfer (Bed to Chair and Back): 10  Total: 55       Barthel and G-code impairment scale:  Percentage of impairment CH  0% CI  1-19% CJ  20-39% CK  40-59% CL  60-79% CM  80-99% CN  100%   Barthel Score 0-100 100 99-80 79-60 59-40 20-39 1-19   0   Barthel Score 0-20 20 17-19 13-16 9-12 5-8 1-4 0      The Barthel ADL Index: Guidelines  1. The index should be used as a record of what a patient does, not as a record of what a patient could do. 2. The main aim is to establish degree of independence from any help, physical or verbal, however minor and for whatever reason. 3. The need for supervision renders the patient not independent. 4. A patient's performance should be established using the best available evidence. Asking the patient, friends/relatives and nurses are the usual sources, but direct observation and common sense are also important. However direct testing is not needed. 5. Usually the patient's performance over the preceding 24-48 hours is important, but occasionally longer periods will be relevant. 6. Middle categories imply that the patient supplies over 50 per cent of the effort. 7. Use of aids to be independent is allowed. Diana Valdovinos., Barthel, DAshleyW. ([id number]). Functional evaluation: the Barthel Index. [address] [facility] (14)2. YANDY Wong, Pete Hoffman., Marcial Macias., [place], [address] [place] [address] (1999). Measuring the change indisability after inpatient rehabilitation; comparison of the responsiveness of the Barthel Index and Functional [place] Measure. Journal of Neurology, Neurosurgery, and Psychiatry, 66(4), 221-832. Sary Davenport, N.J.A, RANDY Layne, & Poppy Peña MAshleyA. (2004.) Assessment of post-stroke quality of life in cost-effectiveness studies: The usefulness of the Barthel Index and the EuroQoL-5D. Quality of Life Research, 13, 381-26       G codes: In compliance with CMSs Claims Based Outcome Reporting, the following G-code set was chosen for this patient based on their primary functional limitation being treated:     The outcome measure chosen to determine the severity of the functional limitation was the Barthel Index with a score of 55/100 which was correlated with the impairment scale. ? Mobility - Walking and Moving Around:     - CURRENT STATUS: CK - 40%-59% impaired, limited or restricted    - GOAL STATUS: CJ - 20%-39% impaired, limited or restricted    - D/C STATUS:  ---------------To be determined---------------       Pain:  Pain Scale 1: Numeric (0 - 10)  Pain Intensity 1: 4  Pain Location 1: Back  Pain Orientation 1: Left  Pain Description 1: Aching  Pain Intervention(s) 1: Declines; Emotional support;Repositioned; Rest  Activity Tolerance:   /73 post activity   Please refer to the flowsheet for vital signs taken during this treatment. After treatment:   [x]         Patient left in no apparent distress sitting up in chair  []         Patient left in no apparent distress in bed  [x]         Call bell left within reach  [x]         Nursing notified  []         Caregiver present  []         Bed alarm activated    COMMUNICATION/EDUCATION:   The patients plan of care was discussed with: Registered Nurse. [x]         Fall prevention education was provided and the patient/caregiver indicated understanding. [x]         Patient/family have participated as able in goal setting and plan of care. [x]         Patient/family agree to work toward stated goals and plan of care. []         Patient understands intent and goals of therapy, but is neutral about his/her participation. []         Patient is unable to participate in goal setting and plan of care.     Thank you for this referral.  Fernando Lara, PT   Time Calculation: 20 mins

## 2018-02-10 VITALS
BODY MASS INDEX: 28.29 KG/M2 | HEART RATE: 62 BPM | OXYGEN SATURATION: 96 % | DIASTOLIC BLOOD PRESSURE: 60 MMHG | TEMPERATURE: 98.3 F | HEIGHT: 69 IN | WEIGHT: 191 LBS | RESPIRATION RATE: 16 BRPM | SYSTOLIC BLOOD PRESSURE: 107 MMHG

## 2018-02-10 PROCEDURE — 74011250637 HC RX REV CODE- 250/637: Performed by: NEUROLOGICAL SURGERY

## 2018-02-10 PROCEDURE — 74011250637 HC RX REV CODE- 250/637: Performed by: NURSE PRACTITIONER

## 2018-02-10 RX ORDER — CYCLOBENZAPRINE HCL 10 MG
10 TABLET ORAL
Qty: 30 TAB | Refills: 0 | Status: SHIPPED | OUTPATIENT
Start: 2018-02-10

## 2018-02-10 RX ADMIN — HYDROMORPHONE HYDROCHLORIDE 2 MG: 2 TABLET ORAL at 08:22

## 2018-02-10 RX ADMIN — POLYETHYLENE GLYCOL 3350 17 G: 17 POWDER, FOR SOLUTION ORAL at 08:21

## 2018-02-10 RX ADMIN — LORATADINE 5 MG: 10 TABLET ORAL at 08:21

## 2018-02-10 RX ADMIN — Medication 10 ML: at 06:00

## 2018-02-10 RX ADMIN — HYDROMORPHONE HYDROCHLORIDE 2 MG: 2 TABLET ORAL at 12:08

## 2018-02-10 RX ADMIN — DOCUSATE SODIUM AND SENNOSIDES 1 TABLET: 8.6; 5 TABLET, FILM COATED ORAL at 08:21

## 2018-02-10 RX ADMIN — POTASSIUM CHLORIDE 10 MEQ: 750 TABLET, EXTENDED RELEASE ORAL at 08:21

## 2018-02-10 NOTE — DISCHARGE INSTRUCTIONS
Lumbar Spinal Fusion: What to Expect at Home  Your Recovery    After surgery, you can expect your back to feel stiff and sore. You may have trouble sitting or standing in one position for very long and may need pain medicine in the weeks after your surgery. It may take 4 to 6 weeks to get back to doing simple activities, such as light housework. It may take 6 months to a year for your back to get better completely. You may need to wear a back brace while your back heals. And your doctor may have you go to physical therapy. If your job doesn't require physical labor, you will probably be able to go back to work after 1 or 2 months. If your job involves light physical labor, it may take 3 to 6 months. Most people whose jobs involved heavy labor can never return to those jobs. This care sheet gives you a general idea about how long it will take for you to recover. But each person recovers at a different pace. Follow the steps below to get better as quickly as possible. How can you care for yourself at home? Activity  ? · Rest when you feel tired. Getting enough sleep will help you recover. ? · Try to walk each day. Start by walking a little more than you did the day before. Bit by bit, increase the amount you walk. Walking boosts blood flow and helps prevent pneumonia and constipation. Walking may also decrease your muscle soreness after surgery. ? · If advised by your doctor, you may need to avoid lifting anything that would cause excessive strain on your back. This may include a child, heavy grocery bags and milk containers, a heavy briefcase or backpack, cat litter or dog food bags, or a vacuum . ? · Avoid strenuous activities, such as bicycle riding, jogging, weight lifting, or aerobic exercise, until your doctor says it is okay. ? · Do not drive for 2 to 4 weeks after your surgery or until your doctor says it is okay.    ? · Avoid riding in a car for more than 30 minutes at a time for 2 to 4 weeks after surgery. If you must ride in a car for a longer distance, stop often to walk and stretch your legs. ? · Try to change your position about every 30 minutes while sitting or standing. This will help decrease your back pain while you are healing. ? · You will probably need to take at least 4 to 6 weeks off from work. It depends on the type of work you do and how you feel. ? · You may have sex as soon as you feel able, but avoid positions that put stress on your back or cause pain. Diet  ? · You can eat your normal diet. If your stomach is upset, try bland, low-fat foods like plain rice, broiled chicken, toast, and yogurt. ? · Drink plenty of fluids (unless your doctor tells you not to). ? · You may notice that your bowel movements are not regular right after your surgery. This is common. Try to avoid constipation and straining with bowel movements. You may want to take a fiber supplement every day. If you have not had a bowel movement after a couple of days, ask your doctor about taking a mild laxative. Medicines  ? · Be safe with medicines. Take pain medicines exactly as directed. ¨ If the doctor gave you a prescription medicine for pain, take it as prescribed. ¨ If you are not taking a prescription pain medicine, ask your doctor if you can take an over-the-counter medicine. ? · If your doctor prescribed antibiotics, take them as directed. Do not stop taking them just because you feel better. You need to take the full course of antibiotics. ? · If you think your pain medicine is making you sick to your stomach:  ¨ Take your medicine after meals (unless your doctor has told you not to). ¨ Ask your doctor for a different pain medicine. Incision care  ? · You will be given specific instructions about how to care for the cuts (incisions) the doctor made. The instructions will depend on the type of materials used to close the cut. Exercise  ?  · Do back exercises as instructed by your doctor. ? · Your doctor may advise you to work with a physical therapist to improve the strength and flexibility of your back. Other instructions  ? · To reduce stiffness and help sore muscles, use a warm water bottle, a heating pad set on low, or a warm cloth on your back. Do not put heat right over the incision. Do not go to sleep with a heating pad on your skin. Follow-up care is a key part of your treatment and safety. Be sure to make and go to all appointments, and call your doctor if you are having problems. It's also a good idea to know your test results and keep a list of the medicines you take. When should you call for help? Call 911 anytime you think you may need emergency care. For example, call if:  ? · You passed out (lost consciousness). ? · You have sudden chest pain and shortness of breath, or you cough up blood. ? · You are unable to move a leg at all. ?Call your doctor now or seek immediate medical care if:  ? · You have pain that does not get better after you take pain pills. ? · You have new or worse symptoms in your legs or buttocks. Symptoms may include:  ¨ Numbness or tingling. ¨ Weakness. ¨ Pain. ? · You lose bladder or bowel control. ? · You have loose stitches, or your incision comes open. ? · You have blood or fluid draining from the incision. ? · You have signs of infection, such as:  ¨ Increased pain, swelling, warmth, or redness. ¨ Pus draining from the incision. ¨ A fever. ? Watch closely for any changes in your health, and be sure to contact your doctor if:  ? · You do not have a bowel movement after taking a laxative. ? · You are not getting better as expected. Where can you learn more? Go to http://roopa-suzanne.info/. Enter Q890 in the search box to learn more about \"Lumbar Spinal Fusion: What to Expect at Home. \"  Current as of: March 21, 2017  Content Version: 11.4  © 8103-1535 Healthwise, [employer].  Care instructions adapted under license by [employer] (which disclaims liability or warranty for this information). If you have questions about a medical condition or this instruction, always ask your healthcare professional. [place] [address] any warranty or liability for your use of this information. Patient meets criteria for   BUNDLED PAYMENT   for Care Improvement Initiative Criteria    Contact Information for Orthopedic Nurse Navigator:      ZOË Lu, RN-BC  N:[phone]  U:[phone]  V:[phone]    After Hospital Care Plan:  Discharge Instructions XLIF Surgery   Dr. Jeffery Mock     Patient Name: Singh Nevarez    Date of procedure: 2/8/2018  Date of discharge: 2/9/2018    Procedure: Procedure(s):  L3-4 XLIF WITH ALLOGRAFT AND PLATE (OXYGEN)  PCP: Demond Hwang MD    Follow up appointments  -follow up with Dr. Jeffery Mock in 2 weeks.   Call [phone] to make an appointment as soon as you get home from the hospital.    When to call your Spine Surgeon:  -Signs of infection-if your incision is red; continues to have drainage; drainage has a foul odor or if you have a persistent fever over 101 degrees for 24 hours  -Nausea or vomiting, severe headache  -Loss of bowel or bladder function, inability to urinate  -Changes in sensation in your arms or legs (numbness, tingling, loss of color)  -Increased weakness-greater than before your surgery  -Severe pain or pain not relieved by medications  -Signs of a blood clot in your leg-calf pain, tenderness, redness, swelling of lower leg    When to call your Primary Care Physician:  -Concerns about medical conditions such as diabetes, high blood pressure, asthma, congestive heart failure  -Call if blood sugars are elevated, persistent headache or dizziness, coughing or congestion, constipation or diarrhea, burning with urination, abnormal heart rate    When to call 911 and go to the nearest emergency room:  -Acute onset of chest pain, shortness of breath, difficulty breathing    Activity  -You are going home a well person, be as active as possible. Your only exercise should be walking. Start with short frequent walks and increase your walking distance each day.  -Limit the amount of time you sit to 20-30 minute intervals. Sitting for prolonged periods of time will be uncomfortable for you following surgery.  -Do NOT lift anything over 5 pounds  -Do NOT do any straining, twisting or bending  -When you are in bed, you may lay on your back or on either side. Do NOT lie on your stomach    Brace  -If you have a back brace, you should wear your brace at all times when you are out of bed. Do not wear the brace while in bed or showering.  -Remember to always wear a cotton t-shirt underneath your brace.  -Do not bend or twist when your brace is off. Diet  -Resume usual diet; drink plenty of fluids; eat foods high in fiber  -It is important to have regular bowel movements. Pain medications may cause constipation. You may want to take a stool softener (such as Senokot-S or Colace) to prevent constipation.   -If constipation occurs, take a laxative (such as Dulcolax tablets, Milk of Magnesia, or a suppository). Laxatives should only be used if the above preventable measures have failed and you still have not had a bowel movement after three days    Driving  -You may not drive or return to work until instructed by your physician. However, you may ride in the car for short periods of time. Incision Care  -You may take brief showers but do not run the water run directly onto the wound. After showering or bathing, gently blot the wound dry with a clean, soft towel.  -Do not rub or apply any lotions or ointments to your incision site.   -Do not soak or scrub your wound    Showering  -You may shower in approximately 3 days after your surgery.    -Leave the dressing on during your shower. Do NOT allow the water to run directly onto your dressing. Once you get out of the shower, put on a dry dressing.  -Reminder- your brace can be removed while showering. Remember to not bend or twist while your brace is off.    -Do not take a tub bath. Preventing blood clots  -You have been given T.E.D. stockings to wear. Continue to wear these for 7 days after your discharge. Put them on in the morning and take them off at night.    -They are used to increase your circulation and prevent blood clots from forming in your legs  -T. E.D. stockings can be machine washed, temperature not to exceed 160° F (71°C) and machine dried for 15 to 20 minutes, temperature not to exceed 250° F (121°C). Pain management  -Take pain medication as prescribed; decrease the amount you use as your pain lessens  -DO not wait until you are in extreme pain to take your medication.  -Avoid alcoholic beverages while taking pain medication    Pain Medication Safety  DO:  -Read the Medication Guide   -Take your medicine exactly as prescribed   -Store your medicine away from children and in a safe place   -Flush unused medicine down the toilet   -Call your healthcare provider for medical advice about side effects. You may report side effects to FDA at 6-781-FDA-4494.   -Please be aware that many medications contain Tylenol. We do not want you to over medicate so please read the information below as a guide. Do not take more than 4 Grams of Tylenol in a 24 hour period.   (There are 1000 milligrams in one Gram)                                                                                                                                                                                                                                                                                                                                                                  Percocet contains 325 mg of Tylenol per tablet (do not take more than 12 tablets in 24 hours)  Lortab contains 500 mg of Tylenol per tablet (do not take more than 8 tablets in 24 hours)  Norco contains 325 mg of Tylenol per tablet (do not take more than 12 tablets in 24 hours). DO NOT:  -Do not give your medicine to others   -Do not take medicine unless it was prescribed for you   -Do not stop taking your medicine without talking to your healthcare provider   -Do not break, chew, crush, dissolve, or inject your medicine. If you cannot swallow your medicine whole, talk to your healthcare provider.  -Do not drink alcohol while taking this medicine  -Do not take anti-inflammatory medications or aspirin unless instructed by your   physician.

## 2018-02-10 NOTE — ROUTINE PROCESS
[address]  [facility] Bundled Payment Handoff     FROM:                                TO: Home                                                      ([address] [place] [address] or Facility name)  [address]  [facility] [address] [place] [address]  Dept: [address] [place]: [phone]                                      Room#:  26/36                                                      Discharging Nurse: Louisa Long RN  Unit TW#:[phone]         SITUATION      ASAScore: ASA 2 - Patient with mild systemic disease with no functional limitations    Admitted:  2/8/2018  Hospital Day: 3      Attending Provider:  Gina Schultz MD     Consultations:  None    PCP:  Lety Stinson MD   [phone]     Admitting Dx:  L3-4 SPONDELOLYTHESIS  Lumbar stenosis       Active Problems:    Lumbar stenosis with neurogenic claudication (2/8/2018)      2 Days Post-Op of   Procedure(s):  L3-4 XLIF WITH ALLOGRAFT AND PLATE (OXYGEN)   BY: Gina Schultz MD             ON: 2/8/2018                  Code Status: No Order             Advance Directive? Not Received (Send w/patient)     Isolation:  There are currently no Active Isolations       MDRO: No current active infections    BACKGROUND     Allergies: Allergies   Allergen Reactions    Amoxicillin Rash    Hydrocodone Hives    Penicillins Hives    Percocet [Oxycodone-Acetaminophen] Itching       Past Medical History:   Diagnosis Date    Arthritis     Bleeds easily ([address]     Chronic pain     Essential hypertension     Lumbar stenosis with neurogenic claudication 2/8/2018    Skin cancer     BCCA; PROSTATE       Past Surgical History:   Procedure Laterality Date    [facility]    HX PROSTATECTOMY  2002       Prior to Admission Medications   Prescriptions Last Dose Informant Patient Reported?  Taking?   aspirin 81 mg chewable tablet 2/1/2018 at Unknown time  Yes Yes   Sig: Take 81 mg by mouth daily.   cholecalciferol, vitamin D3, (VITAMIN D3) 2,000 unit tab 2/1/2018 at Unknown time  Yes Yes   Sig: Take  by mouth daily. naproxen sodium (ALEVE) 220 mg tablet 2/1/2018 at Unknown time  Yes Yes   Sig: Take 440 mg by mouth two (2) times daily as needed. potassium chloride SR (KLOR-CON 10) 10 mEq tablet 2/1/2018 at Unknown time  Yes Yes   Sig: Take  by mouth daily. valsartan-hydroCHLOROthiazide (DIOVAN-HCT) 80-12.5 mg per tablet 2/7/2018 at Unknown time  Yes Yes   Sig: Take 1 Tab by mouth daily. Facility-Administered Medications: None       Vaccinations: There is no immunization history on file for this patient. ASSESSMENT   Age: 81 y.o. Gender: male        Height: Height: 5' 9.25\" (175.9 cm)                    Weight:Weight: 86.6 kg (191 lb)     Patient Vitals for the past 8 hrs:   Temp Pulse Resp BP SpO2   02/10/18 0741 98.3 °F (36.8 °C) 62 16 107/60 96 %            Active Orders   Diet    DIET REGULAR       Orientation: Orientation Level: Oriented X4    Active Lines/Drains:  (Peg Tube / [place] / CL or S/L?):no    Urinary Status: Voiding      Last BM: Last Bowel Movement Date: 02/06/18     Skin Integrity: Incision (comment) (surgical back)   Wound Flank Left-DRESSING STATUS: Clean, dry, and intact    Wound Flank Left-DRESSING TYPE: Topical skin adhesive/glue    Mobility: Slightly limited   Weight Bearing Status: WBAT (Weight Bearing as Tolerated)      Gait Training  Assistive Device: Brace/Splint, Gait belt  Ambulation - Level of Assistance: Stand-by asssistance  Distance (ft): 300 Feet (ft)     On Anticoagulation?                                         Last dose:  n/a  Pain Medications given:  dilaudide   2 mg                             Last dose:02/10/18  at 1200    Lab Results   Component Value Date/Time    Glucose 119 (H) 02/01/2018 02:51 PM    INR 1.0 02/01/2018 02:51 PM    HGB 13.7 02/01/2018 02:51 PM       Readmission Risks:  Score:         RECOMMENDATION     See After Visit Summary (AVS) for:  · Discharge instructions  · After [address]   · Medication Reconciliation          [facility] Orthopaedic Nurse Navigator  ZOË Ramirez, RN-BC       Office  [phone]  Cell      [phone]  Fax      [phone]  [email or web address]             . [place]

## 2018-02-13 ENCOUNTER — TELEPHONE (OUTPATIENT)
Dept: MEDSURG UNIT | Age: 83
End: 2018-02-13

## 2018-02-13 NOTE — TELEPHONE ENCOUNTER
2/13/18 - doing well at home. Pain is tolerable. Constipation issue but took mag citrate with + results. Appointment made for 2/20 & 3/23.

## 2018-02-14 NOTE — DISCHARGE SUMMARY
[address] [place], [place] [address] [address]  [phone]     Discharge Summary       PATIENT ID: Cindy Euceda  MRN: [id number]   YOB: 1935    DATE OF ADMISSION: 2/8/2018  5:28 AM    DATE OF DISCHARGE: 2/10/2018   PRIMARY CARE PROVIDER: Jesi Delgado MD     CONSULTATIONS: None    PROCEDURES/SURGERIES: Procedure(s):  L3-4 XLIF WITH ALLOGRAFT AND PLATE (OXYGEN)    History of Present Illness: Cindy Euceda is a 81 y.o. male with prior medical history significant for hypertension and prostate cancer s/p prostatectomy in 2002. He presented to Dr. Betty Camacho clinic with complaints of progressive back and leg pain, unresponsive to conservative treatment. Symptoms consistent with neurogenic claudication. His legs feel weak and rubbery. He had a L3-4 SHIVANI at [facility] which provided minimal relief in his symptoms. 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 After failing conservative therapy and a discussion of the risks, benefits, alternatives, perioperative course, and potential complications of surgery, he consented to undergo a Procedure(s):  L3-4 XLIF WITH ALLOGRAFT AND PLATE (OXYGEN). Hospital Course:  Cindy Euceda tolerated the procedure well. He was transferred  to the recovery room in stable condition. After a brief stay the patient was then transferred to the Spinal Surgery Unit at [address] [place] [address].  On postoperative day #1, the dressing was clean and dry, he was neurovascularly intact. The patient was afebrile and vital signs were stable. Calves were soft and non-tender bilaterally. The patient was tolerating a regular diet and making progress with physical therapy.       Hemoglobin prior to discharge were   Lab Results   Component Value Date/Time    HGB 13.7 02/01/2018 02:51 PM        Cindy Euceda made satisfactory progress with physical therapy and was discharged to Home in stable condition on postoperative day 2. He was provided with routine postoperative instructions and advised to follow up with  Raul Calhoun MD in 2 weeks following discharge from the hospital.        FOLLOW UP APPOINTMENTS:    Follow-up Information     Follow up With Details Comments Jose Brown MD   [facility]:  Discharge Medication List as of 2/10/2018 11:32 AM      START taking these medications    Details   cyclobenzaprine (FLEXERIL) 10 mg tablet Take 1 Tab by mouth three (3) times daily as needed for Muscle Spasm(s). Indications: Muscle Spasm, Print, Disp-30 Tab, R-0      HYDROmorphone (DILAUDID) 2 mg tablet Take 1 Tab by mouth every four (4) hours as needed. Max Daily Amount: 12 mg., Print, Disp-60 Tab, R-0      senna-docusate (PERICOLACE) 8.6-50 mg per tablet Take 1 Tab by mouth two (2) times a day., Print, Disp-60 Tab, R-0         CONTINUE these medications which have NOT CHANGED    Details   valsartan-hydroCHLOROthiazide (DIOVAN-HCT) 80-12.5 mg per tablet Take 1 Tab by mouth daily. , Historical Med      potassium chloride SR (KLOR-CON 10) 10 mEq tablet Take  by mouth daily. , Historical Med      cholecalciferol, vitamin D3, (VITAMIN D3) 2,000 unit tab Take  by mouth daily. , Historical Med      aspirin 81 mg chewable tablet Take 81 mg by mouth daily. , Historical Med         STOP taking these medications       naproxen sodium (ALEVE) 220 mg tablet Comments:   Reason for Stopping:               PHYSICAL EXAMINATION AT DISCHARGE:  General: Pleasant, alert, cooperative, no distress. EENT: EOMI. Anicteric sclerae. Oral mucous moist, oropharynx benign. Resp: Equal chest expansion. No accessory muscle use. CV:  No cyanosis or clubbing. No edema appreciated in the extremities. Gastrointestinal:  Soft, non-tender, non-distended.  normoactive bowel sounds, no hepatosplenomegaly  Neurological: Follows commands. JOEL. Speech clear. Sensation intact to light touch. Motor: unchanged  L2-S1. Musculoskeletal:  Calves soft, supple, non-tender upon palpation or with passive stretch. Psych: Good insight. Not anxious nor agitated. Skin: Good turgor. No rashes or lesions. Incision - clean, dry and intact. No significant erythema or swelling.       CHRONIC MEDICAL DIAGNOSES:  Problem List as of 2/10/2018  Date Reviewed: 2/8/2018          Codes Class Noted - Resolved    Lumbar stenosis with neurogenic claudication ICD-10-CM: G81.484  ICD-9-CM: 724.03  2/8/2018 - Present              Signed:   Gerhard Moya NP  2/14/2018  11:12 AM

## 2018-04-09 ENCOUNTER — APPOINTMENT (OUTPATIENT)
Dept: GENERAL RADIOLOGY | Age: 83
End: 2018-04-09
Attending: EMERGENCY MEDICINE
Payer: MEDICARE

## 2018-04-09 ENCOUNTER — HOSPITAL ENCOUNTER (EMERGENCY)
Age: 83
Discharge: HOME | End: 2018-04-09
Attending: EMERGENCY MEDICINE
Payer: MEDICARE

## 2018-04-09 VITALS
RESPIRATION RATE: 16 BRPM | SYSTOLIC BLOOD PRESSURE: 166 MMHG | BODY MASS INDEX: 26.11 KG/M2 | HEART RATE: 60 BPM | DIASTOLIC BLOOD PRESSURE: 83 MMHG | TEMPERATURE: 97.9 F | OXYGEN SATURATION: 98 % | HEIGHT: 69 IN | WEIGHT: 176.25 LBS

## 2018-04-09 DIAGNOSIS — R53.1 WEAKNESS: Primary | ICD-10-CM

## 2018-04-09 LAB
ALBUMIN SERPL-MCNC: 3.9 G/DL (ref 3.5–5)
ALBUMIN/GLOB SERPL: 1.1 {RATIO} (ref 1.1–2.2)
ALP SERPL-CCNC: 100 U/L (ref 45–117)
ALT SERPL-CCNC: 18 U/L (ref 12–78)
ANION GAP SERPL CALC-SCNC: 8 MMOL/L (ref 5–15)
APPEARANCE UR: CLEAR
AST SERPL-CCNC: 13 U/L (ref 15–37)
ATRIAL RATE: 62 BPM
BACTERIA URNS QL MICRO: NEGATIVE /HPF
BASOPHILS # BLD: 0.1 K/UL (ref 0–0.1)
BASOPHILS NFR BLD: 1 % (ref 0–1)
BILIRUB SERPL-MCNC: 0.8 MG/DL (ref 0.2–1)
BILIRUB UR QL CFM: NEGATIVE
BUN SERPL-MCNC: 16 MG/DL (ref 6–20)
BUN/CREAT SERPL: 15 (ref 12–20)
CALCIUM SERPL-MCNC: 9.6 MG/DL (ref 8.5–10.1)
CALCULATED P AXIS, ECG09: 67 DEGREES
CALCULATED R AXIS, ECG10: 51 DEGREES
CALCULATED T AXIS, ECG11: 51 DEGREES
CHLORIDE SERPL-SCNC: 103 MMOL/L (ref 97–108)
CO2 SERPL-SCNC: 28 MMOL/L (ref 21–32)
COLOR UR: ABNORMAL
CREAT SERPL-MCNC: 1.06 MG/DL (ref 0.7–1.3)
DIAGNOSIS, 93000: NORMAL
DIFFERENTIAL METHOD BLD: NORMAL
EOSINOPHIL # BLD: 0 K/UL (ref 0–0.4)
EOSINOPHIL NFR BLD: 0 % (ref 0–7)
EPITH CASTS URNS QL MICRO: ABNORMAL /LPF
ERYTHROCYTE [DISTWIDTH] IN BLOOD BY AUTOMATED COUNT: 14.3 % (ref 11.5–14.5)
GLOBULIN SER CALC-MCNC: 3.7 G/DL (ref 2–4)
GLUCOSE SERPL-MCNC: 99 MG/DL (ref 65–100)
GLUCOSE UR STRIP.AUTO-MCNC: NEGATIVE MG/DL
HCT VFR BLD AUTO: 48.5 % (ref 36.6–50.3)
HGB BLD-MCNC: 16.1 G/DL (ref 12.1–17)
HGB UR QL STRIP: NEGATIVE
HYALINE CASTS URNS QL MICRO: ABNORMAL /LPF (ref 0–5)
IMM GRANULOCYTES # BLD: 0 K/UL (ref 0–0.04)
IMM GRANULOCYTES NFR BLD AUTO: 0 % (ref 0–0.5)
KETONES UR QL STRIP.AUTO: 40 MG/DL
LACTATE SERPL-SCNC: 1.2 MMOL/L (ref 0.4–2)
LEUKOCYTE ESTERASE UR QL STRIP.AUTO: NEGATIVE
LYMPHOCYTES # BLD: 2 K/UL (ref 0.8–3.5)
LYMPHOCYTES NFR BLD: 33 % (ref 12–49)
MCH RBC QN AUTO: 32 PG (ref 26–34)
MCHC RBC AUTO-ENTMCNC: 33.2 G/DL (ref 30–36.5)
MCV RBC AUTO: 96.4 FL (ref 80–99)
MONOCYTES # BLD: 0.5 K/UL (ref 0–1)
MONOCYTES NFR BLD: 9 % (ref 5–13)
NEUTS SEG # BLD: 3.3 K/UL (ref 1.8–8)
NEUTS SEG NFR BLD: 57 % (ref 32–75)
NITRITE UR QL STRIP.AUTO: NEGATIVE
NRBC # BLD: 0 K/UL (ref 0–0.01)
NRBC BLD-RTO: 0 PER 100 WBC
P-R INTERVAL, ECG05: 162 MS
PH UR STRIP: 6 [PH] (ref 5–8)
PLATELET # BLD AUTO: 300 K/UL (ref 150–400)
PMV BLD AUTO: 9.2 FL (ref 8.9–12.9)
POTASSIUM SERPL-SCNC: 4.2 MMOL/L (ref 3.5–5.1)
PROT SERPL-MCNC: 7.6 G/DL (ref 6.4–8.2)
PROT UR STRIP-MCNC: NEGATIVE MG/DL
Q-T INTERVAL, ECG07: 412 MS
QRS DURATION, ECG06: 80 MS
QTC CALCULATION (BEZET), ECG08: 418 MS
RBC # BLD AUTO: 5.03 M/UL (ref 4.1–5.7)
RBC #/AREA URNS HPF: ABNORMAL /HPF (ref 0–5)
SODIUM SERPL-SCNC: 139 MMOL/L (ref 136–145)
SP GR UR REFRACTOMETRY: 1.03 (ref 1–1.03)
TROPONIN I SERPL-MCNC: <0.04 NG/ML
UR CULT HOLD, URHOLD: NORMAL
UROBILINOGEN UR QL STRIP.AUTO: 0.2 EU/DL (ref 0.2–1)
VENTRICULAR RATE, ECG03: 62 BPM
WBC # BLD AUTO: 5.9 K/UL (ref 4.1–11.1)
WBC URNS QL MICRO: ABNORMAL /HPF (ref 0–4)

## 2018-04-09 PROCEDURE — 84484 ASSAY OF TROPONIN QUANT: CPT | Performed by: EMERGENCY MEDICINE

## 2018-04-09 PROCEDURE — 96360 HYDRATION IV INFUSION INIT: CPT

## 2018-04-09 PROCEDURE — 74011250636 HC RX REV CODE- 250/636: Performed by: EMERGENCY MEDICINE

## 2018-04-09 PROCEDURE — 97116 GAIT TRAINING THERAPY: CPT

## 2018-04-09 PROCEDURE — 85025 COMPLETE CBC W/AUTO DIFF WBC: CPT | Performed by: EMERGENCY MEDICINE

## 2018-04-09 PROCEDURE — 99284 EMERGENCY DEPT VISIT MOD MDM: CPT

## 2018-04-09 PROCEDURE — G8978 MOBILITY CURRENT STATUS: HCPCS

## 2018-04-09 PROCEDURE — 93005 ELECTROCARDIOGRAM TRACING: CPT

## 2018-04-09 PROCEDURE — 83605 ASSAY OF LACTIC ACID: CPT | Performed by: EMERGENCY MEDICINE

## 2018-04-09 PROCEDURE — 81001 URINALYSIS AUTO W/SCOPE: CPT | Performed by: EMERGENCY MEDICINE

## 2018-04-09 PROCEDURE — 71046 X-RAY EXAM CHEST 2 VIEWS: CPT

## 2018-04-09 PROCEDURE — 36415 COLL VENOUS BLD VENIPUNCTURE: CPT | Performed by: EMERGENCY MEDICINE

## 2018-04-09 PROCEDURE — 87040 BLOOD CULTURE FOR BACTERIA: CPT | Performed by: EMERGENCY MEDICINE

## 2018-04-09 PROCEDURE — G8980 MOBILITY D/C STATUS: HCPCS

## 2018-04-09 PROCEDURE — G8979 MOBILITY GOAL STATUS: HCPCS

## 2018-04-09 PROCEDURE — 80053 COMPREHEN METABOLIC PANEL: CPT | Performed by: EMERGENCY MEDICINE

## 2018-04-09 PROCEDURE — 97161 PT EVAL LOW COMPLEX 20 MIN: CPT

## 2018-04-09 RX ADMIN — SODIUM CHLORIDE 1000 ML: 900 INJECTION, SOLUTION INTRAVENOUS at 10:37

## 2018-04-09 NOTE — SENIOR SERVICES NOTE
physical Therapy Emergency Department EVALUATION/DISCHARGE  Patient: Jeffrey Pires (70 y.o. male)  Date: 4/9/2018  Primary Diagnosis: There are no admission diagnoses documented for this encounter. Precautions:      ASSESSMENT :  Chart reviewed. Patient cleared to be seen by MD. Patient presents from home with progressive weakness, which has since resolved. Patient stating \"I get weak becaus my blood pressure gets low\". Based on the objective data described below, the patient presents with some confusion but otherwise approaching functional baseline. Verbal cues for log rolling technique. Reviewed spinal precautions. Patient ambulating without assist x 225 feet without device. Quick and steady kala. No weakness or fatigue observed. Low fall risk. Patient is no longer going to outpatient PT for his back. No further PT needs at this time. MD aware. Further acute physical therapy is not indicated at this time. PLAN :  Discharge Recommendations:     [x]   Home with family  []   Skilled nursing facility  []   Admission to hospital with rehab likely needed  []   Inpatient rehab referral  []   Outpatient physical therapy referral  []   Other:    Further Equipment Recommendations for Discharge:  none  []   Rolling walker with 5\" wheels  []   Crutches   []   Alfonso Reinier   []   Wheelchair   []   Other:     COMMUNICATION/EDUCATION:   Communication/Collaboration:  [x]   Fall prevention education was provided and the patient/caregiver indicated understanding. [x]   Patient/family have participated as able and agree with findings and recommendations. []   Patient is unable to participate in plan of care at this time. Findings and recommendations were discussed with: MD physician and Registered Nurse       SUBJECTIVE:   Patient stated Everyone worries too much.     OBJECTIVE DATA SUMMARY:   HISTORY:    Past Medical History:   Diagnosis Date    Arthritis     Bleeds easily ([address]     Chronic pain     Essential hypertension     Lumbar stenosis with neurogenic claudication 2/8/2018    Skin cancer     BCCA; PROSTATE     Past Surgical History:   Procedure Laterality Date    [facility]    HX ORTHOPAEDIC      back surgery     HX PROSTATECTOMY  2002     Prior Level of Function/Home Situation: Patient lives with his wife. Ambulates without device independently. No falls reported. Patient appears to have some confusion at baseline. Back surgery Feb 2018  Personal factors and/or comorbidities impacting plan of care:     Home Situation  Home Environment: Private residence  # Steps to Enter: 0  One/Two Story Residence: One story  Living Alone: No  Support Systems: Spouse/Significant Other/Partner, Family member(s)  Patient Expects to be Discharged to[de-identified] Private residence  Current DME Used/Available at Home: None, Walker, rolling    EXAMINATION/PRESENTATION/DECISION MAKING:   Critical Behavior:  Orientation Level: Oriented X4    Hearing: Auditory  Auditory Impairment: Hard of hearing, bilateral, Hearing aid(s)  Hearing Aids/Status: At home     Strength:    Strength:  Within functional limits     Tone & Sensation:   Tone: Normal  Sensation: Intact    Coordination:  Coordination: Within functional limits    Functional Mobility:  Bed Mobility:  Supine to Sit: Minimum assistance (cues for log rolling)  Transfers:  Sit to Stand: Stand-by assistance  Stand to Sit: Stand-by assistance  Balance:   Sitting: Intact  Standing: Intact  Ambulation/Gait Training:  Distance (ft): 225 Feet (ft)  Assistive Device: Gait belt  Ambulation - Level of Assistance: Stand-by assistance  Gait Abnormalities: Decreased step clearance  Base of Support: Narrowed    Special Tests:  10 Meter walk test:  (Specify if any supplemental oxygen is used, the type, pre, during and post sats.)    Self-Selected Or Fast-Velocity: Self Selected Velocity  Trial 1: 9 Seconds  Trial 2: 9 Seconds  Trial 3: 9 Seconds   Average : 9 Seconds  Score: 1.11 m/s             Walking Speed (m/s)  Modifier Scale Age 52-63 Age 61-76 Age 66-77 Age 80-80    Male Female Male Female Male Female Male Female   CH   0% Impaired ? 1.39 ? 1.40 ? 1.36 ? 1.30 ? 1.33 ? 1.27 ? 1.21 ? 1.15   CI   1-19% Impaired 1.11-1.38 1.12-1.39 1.09-1.35 1.04-1.29 1.06-1.32 1.01-1.26 0.96-1.20 0.92-1.14   CJ   20-39% Impaired 0.83-1.10 0.84-1.11 0.82-1.08 0.78-1.03 0.80-1.05 0.76-1.00 0.72-0.95 0.69-0.91   CK   40-59% Impaired 0.56-0.82 0.57-0.83 0.54-0.81 0.52-0.77 0.53-0.79 0.51-0.75 0.48-0.71 0.46-0.68   CL   60-79% Impaired 0.28-0.55 0.28-0.56 0.27-0.53 0.26-0.51 0.27-0.52 0.25-0.50 0.24-0.49 0.23-0.45   CM   80-99% Impaired 0.01-0.28 < 0.01-0.28 < 0.01-0.27 < 0.01-0.26 0.01-0.27 0.01-0.24 0.01-0.23 0.01-0.22   CN   100% Impaired Cannot Perform   Minimal Detectable Change (MDC-90) = 0.1 m/s  Juany R. \"Comfortable and maximum walking speed of adults aged 20-79 years: reference values and determinants. \" Age and Agin Volume 26(1):15-9. Arian Coombs \"Age- and gender-related test performance in community-dwelling elderly people: Six-Minute Walk Test, [place] Balance Scale, Timed Up & Go Test, and gait speeds. \" Physical Therapy: 2002 Volume 82(2):128-37. Syeda Watters DM, Silvino PW, Amado Cespedes JD, Fernie SPRING. \"Assessing stability and change of four performance measures: a longitudinal study evaluating outcome following total hip and knee arthroplasty. \" [facility] Musculoskeletal Disorders: 2005 Volume 6(3). Ventura Ruelas, PhD; Marlen Ortiz, PhD. Fred Cotton Paper: \"Walking Speed: the Sixth Vital Sign\" Journal of Geriatric Physical Therapy: 2009 - Volume 32 - Issue 2 - p 25 . In compliance with CMSs Claims Based Outcome Reporting, the following G-code set was chosen for this patient based on their primary functional limitation being treated:     The outcome measure chosen to determine the severity of the functional limitation was the 10 MWT with a score of 1.11 m/s which was correlated with the impairment scale. ? Mobility - Walking and Moving Around:     - CURRENT STATUS: CI - 1%-19% impaired, limited or restricted    - GOAL STATUS: CI - 1%-19% impaired, limited or restricted    - D/C STATUS:  CI - 1%-19% impaired, limited or restricted    Physical Therapy Evaluation Charge Determination   History Examination Presentation Decision-Making   LOW Complexity : Zero comorbidities / personal factors that will impact the outcome / POC LOW Complexity : 1-2 Standardized tests and measures addressing body structure, function, activity limitation and / or participation in recreation  LOW Complexity : Stable, uncomplicated  LOW Complexity : FOTO score of       Based on the above components, the patient evaluation is determined to be of the following complexity level: LOW     Pain:  Pain Scale 1: Numeric (0 - 10)  Pain Intensity 1: 0     Activity Tolerance:   BP stable with positional changes. No dizziness or fatigue reported. Please refer to the flowsheet for vital signs taken during this treatment.   After treatment:   [x]         Patient left in no apparent distress sitting up in chair  []         Patient left in no apparent distress in bed  [x]         Call bell left within reach  [x]         Nursing notified  []         Caregiver present  []         Bed alarm activated        Thank you for this referral.  Gena Tineo PT, DPT   Time Calculation: 17 mins

## 2018-04-09 NOTE — ED TRIAGE NOTES
Pt c/o feeling weak x 4 days, confusion per family x 2 days, denies n/v, denies chest pain or abd pain, denies sob, denies fever, denies diarrhea or constipation, bp in 90's at home the other day

## 2018-04-09 NOTE — ED NOTES
Pt ambulatory out of ED with discharge instructions and prescriptions in hand given by Dr. Yevgeniy Ravi; pt verbalized understanding of discharge paperwork and time allotted for questions. VSS. Pt alert and oriented.

## 2018-04-09 NOTE — ED PROVIDER NOTES
HPI       81y M here with generalized weakness. Had back surgery a few months ago and since then has felt tired and weak. No focal weakness. No fever. No cough. No shortness of breath. No chest pain. No nausea, vomiting, or diarrhea. Has been checking his BP at home and has been in the 110-120's. He did have one episode a few days ago when his cuff malfunctioned and gave him a reading of 90 systolic but on repeat it was 120's. He is not dizzy. No falls. No gait changes. Family reports that patient is having confusion and altered mental status. Past Medical History:   Diagnosis Date    Arthritis     Bleeds easily ([address]     Chronic pain     Essential hypertension     Lumbar stenosis with neurogenic claudication 2/8/2018    Skin cancer     BCCA; PROSTATE       Past Surgical History:   Procedure Laterality Date    [facility]    HX ORTHOPAEDIC      back surgery     HX PROSTATECTOMY  2002         Family History:   Problem Relation Age of Onset    Anesth Problems Neg Hx        Social History     Social History    Marital status:      Spouse name: N/A    Number of children: N/A    Years of education: N/A     Occupational History    Not on file. Social History Main Topics    Smoking status: Never Smoker    Smokeless tobacco: Never Used    Alcohol use Yes      Comment: on special occasions    Drug use: No    Sexual activity: Not on file     Other Topics Concern    Not on file     Social History Narrative         ALLERGIES: Amoxicillin; Hydrocodone; Penicillins; and Percocet [oxycodone-acetaminophen]    Review of Systems   Review of Systems   Constitutional: (-) weight loss. HEENT: (-) stiff neck   Eyes: (-) discharge. Respiratory: (-) for cough. Cardiovascular: (-) syncope. Gastrointestinal: (-) blood in stool. Genitourinary: (-) hematuria. Musculoskeletal: (-) myalgias. Neurological: (-) seizure.    Skin: (-) petechiae  Lymph/Immunologic: (-) enlarged lymph nodes  All other systems reviewed and are negative. Vitals:    04/09/18 0846   BP: 128/73   Pulse: 70   Resp: 16   SpO2: 98%   Weight: 79.9 kg (176 lb 4 oz)   Height: 5' 9\" (1.753 m)            Physical Exam Nursing note and vitals reviewed. Constitutional: oriented to person, place, and time. appears well-developed and well-nourished and elderly. No distress. Head: Normocephalic and atraumatic. Sclera anicteric  Nose: No rhinorrhea  Mouth/Throat: Oropharynx is clear and moist. Pharynx normal  Eyes: Conjunctivae are normal. Pupils are equal, round, and reactive to light. Right eye exhibits no discharge. Left eye exhibits no discharge. Neck: Painless normal range of motion. Neck supple. No LAD. Cardiovascular: Normal rate, regular rhythm, normal heart sounds and intact distal pulses. Exam reveals no gallop and no friction rub. No murmur heard. Pulmonary/Chest:  No respiratory distress. No wheezes. No rales. No rhonchi. No increased work of breathing. No accessory muscle use. Good air exchange throughout. Abdominal: soft, non-tender, no rebound or guarding. No hepatosplenomegaly. Normal bowel sounds throughout. Back: no tenderness to palpation, no deformities, no CVA tenderness  Extremities/Musculoskeletal: Normal range of motion. no tenderness. No edema. Distal extremities are neurovasc intact. Lymphadenopathy:   No adenopathy. Neurological:  Alert and oriented to person, place, and time. Coordination normal. CN 2-12 intact. Motor and sensory function intact. Skin: Skin is warm and dry. No rash noted. No pallor. MDM 81y M here with generalized weakness. Non-focal exam here. Will check labs, urine, ecg, cxr, give fluids, and check orthostatics. ED Course       Procedures      ED EKG interpretation:  Rhythm: normal sinus rhythm; and regular .  Rate (approx.): 62; Axis: normal; P wave: normal; QRS interval: normal ; ST/T wave: normal;  This EKG was interpreted by Maynor Alexis Gabriela Delgado MD,ED Provider. 1:12 PM  Labs ok. Pt ambulates well with PT. He has no AMS here. Will dc with PMD follow-up regarding what medications he should be taking.

## 2018-04-09 NOTE — PROGRESS NOTES
Patient see by therapy. Ambulating well without device x 200+ feet. No dizziness or weakness reported. BPs stable with positional changes. Some cognitive concerns and confusion, however functionally patient is at baseline. Recommend home discharge with family members. Full evaluation to follow.     Gena Valle PT, DPT

## 2018-04-09 NOTE — PROGRESS NOTES
Admission Medication Reconciliation:    Information obtained from:  Patient/RxQuery    Comments/Recommendations: Updated PTA meds/reviewed patient's allergies. 1)  Unfortunately family not present when interviewing patient. Patient appears to be a competent historian however narrative differs significantly with discharge instructions 2/10/2018.    2)  Per patient's report \"they stopped my aspirin, vitamin D, potassium, and blood pressure medicine when they checked me out of here\" (referring to discharge 2/10/2018 s/p L3-4 XLIF w/allograft on 2/08). Patient denies taking these medications since then, however the Discharge Summary states to continue the aforementioned medications. 3)  Patient states he took Dilaudid and Flexeril for 1-2 days post discharge but said he no longer needed them. He also states he is not taking Pericolace as he has not be constipated. Noted constipation on telephone encounter 2/13, however no other mention. 4)  At this time, I am unable to fully perform patient's medication reconciliation without family input. Per patient, his wife is currently at orthodontist having a root canal.        Significant PMH/Disease States:   Past Medical History:   Diagnosis Date    Arthritis     Bleeds easily ([address]     Chronic pain     Essential hypertension     Lumbar stenosis with neurogenic claudication 2/8/2018    Skin cancer     BCCA; PROSTATE     Chief Complaint for this Admission:    Chief Complaint   Patient presents with    Hypotension     Allergies:  Amoxicillin; Hydrocodone; Penicillins; and Percocet [oxycodone-acetaminophen]    Prior to Admission Medications:   Prior to Admission Medications   Prescriptions Last Dose Informant Patient Reported? Taking? HYDROmorphone (DILAUDID) 2 mg tablet   No Yes   Sig: Take 1 Tab by mouth every four (4) hours as needed. Max Daily Amount: 12 mg.   aspirin 81 mg chewable tablet   Yes Yes   Sig: Take 81 mg by mouth daily.    cholecalciferol, vitamin D3, (VITAMIN D3) 2,000 unit tab   Yes Yes   Sig: Take  by mouth daily. cyclobenzaprine (FLEXERIL) 10 mg tablet   No Yes   Sig: Take 1 Tab by mouth three (3) times daily as needed for Muscle Spasm(s). Indications: Muscle Spasm   potassium chloride SR (KLOR-CON 10) 10 mEq tablet   Yes Yes   Sig: Take  by mouth daily. senna-docusate (PERICOLACE) 8.6-50 mg per tablet   No Yes   Sig: Take 1 Tab by mouth two (2) times a day. valsartan-hydroCHLOROthiazide (DIOVAN-HCT) 80-12.5 mg per tablet   Yes Yes   Sig: Take 1 Tab by mouth daily.       Facility-Administered Medications: None

## 2018-04-11 NOTE — CALL BACK NOTE
[facility] Emergency Department Follow Up Call Record    Discharged to : Home/Family Home/Home Health/Skilled Facility/Rehab/Assisted Living/Other__Home_____  1) Did you receive your discharge instructions? Yes        2) Do you understand them? Yes  This writer spoke with Mrs. Víctor Andersen who confirms that her                                            Is feeling better, no problems with fatigue or low BP. 3) Are you able to follow them? Yes          If NO, what can I clarify for you? 4) Do you understand your diagnosis? Yes Mr. Víctor Andersen believes he had dehydration, he feels better now. 5) Do you know which symptoms should prompt you to call the doctor? Yes     6) Were you able to fill and  any medications that were prescribed? Not applicable     7) You were prescribed ___________for ____________________. Common side effects of this medication are____________________. This is not a complete list so please review the forms given from the pharmacy for a complete list.      8) Are there any questions about your medications? No            Have you scheduled any recommended doctors appointments (specialty, PCP) No appointment made as yet. If NO, what barriers are you encountering (transportation/lost contact info/cost/  didnt think necessary/no PCP    No barriers. Mr. Víctor Andersen feels no further need to follow up.   9) If discharged with Home Health, has the agency contacted you to schedule visit? N/A  10) Is there anyone available to help you at home (meals, errands, transportation    monitoring) (adult children, neighbors, private duty companions) Yes    11) Are you on a special diet? No         If YES, do you understand the requirements for this diet? Education provided? 12) If presented with cough, bronchitis, COPD, asthma, is it ok to ask that the   respiratory disease management educator call you?  Not applicable      13)  A) If presented with fall, were you issued an assistive device in the ED    Are you using? Not applicable  B) If given RX for device, have you obtained? Not applicable       If NO, barriers? C) Therapist recommended: NO. Patient was evaluated by [facility] Physical therapist in ED   Are you able to implement the suggestions? YES       If NO, barriers to implementation? NO     D) Are you having any difficulties with mobility inside your home?     (steps, bed, tub) No. No further weakness   If YES, ask if the [facility] PT can contact patient and good time and number?  14)  At the end of your discharge instructions, there is information about accessing [facility], have you had a chance to review those? Yes         Do you have any questions about signing up for this service? NO   We encourage our patients to be active participants in their healthcare and this site is one of the ways to do that. It will allow you to access parts of your medical record, email your doctors office, schedule appointments, and request medications refills . 15) Are there any other questions that I can answer for you regarding    your Emergency department visit?  NO             Estimated Call Time:____3:16 PM  _______________ Date/Time:_______________

## 2018-04-14 LAB
BACTERIA SPEC CULT: NORMAL
SERVICE CMNT-IMP: NORMAL

## 2018-05-07 ENCOUNTER — HOSPITAL ENCOUNTER (EMERGENCY)
Age: 83
Discharge: HOME | End: 2018-05-07
Attending: EMERGENCY MEDICINE
Payer: MEDICARE

## 2018-05-07 ENCOUNTER — APPOINTMENT (OUTPATIENT)
Dept: GENERAL RADIOLOGY | Age: 83
End: 2018-05-07
Attending: PHYSICIAN ASSISTANT
Payer: MEDICARE

## 2018-05-07 VITALS
HEIGHT: 69 IN | RESPIRATION RATE: 19 BRPM | DIASTOLIC BLOOD PRESSURE: 76 MMHG | HEART RATE: 64 BPM | SYSTOLIC BLOOD PRESSURE: 149 MMHG | OXYGEN SATURATION: 98 % | TEMPERATURE: 97.3 F

## 2018-05-07 DIAGNOSIS — I95.2 HYPOTENSION DUE TO DRUGS: Primary | ICD-10-CM

## 2018-05-07 LAB
ALBUMIN SERPL-MCNC: 3.6 G/DL (ref 3.5–5)
ALBUMIN/GLOB SERPL: 0.9 {RATIO} (ref 1.1–2.2)
ALP SERPL-CCNC: 102 U/L (ref 45–117)
ALT SERPL-CCNC: 17 U/L (ref 12–78)
ANION GAP SERPL CALC-SCNC: 8 MMOL/L (ref 5–15)
APPEARANCE UR: ABNORMAL
AST SERPL-CCNC: 19 U/L (ref 15–37)
ATRIAL RATE: 50 BPM
BACTERIA URNS QL MICRO: NEGATIVE /HPF
BASOPHILS # BLD: 0.1 K/UL (ref 0–0.1)
BASOPHILS NFR BLD: 1 % (ref 0–1)
BILIRUB SERPL-MCNC: 0.5 MG/DL (ref 0.2–1)
BILIRUB UR QL: NEGATIVE
BUN SERPL-MCNC: 15 MG/DL (ref 6–20)
BUN/CREAT SERPL: 13 (ref 12–20)
CALCIUM SERPL-MCNC: 9.3 MG/DL (ref 8.5–10.1)
CALCULATED P AXIS, ECG09: 68 DEGREES
CALCULATED R AXIS, ECG10: 45 DEGREES
CALCULATED T AXIS, ECG11: 52 DEGREES
CHLORIDE SERPL-SCNC: 104 MMOL/L (ref 97–108)
CO2 SERPL-SCNC: 25 MMOL/L (ref 21–32)
COLOR UR: ABNORMAL
CREAT SERPL-MCNC: 1.12 MG/DL (ref 0.7–1.3)
DIAGNOSIS, 93000: NORMAL
DIFFERENTIAL METHOD BLD: NORMAL
EOSINOPHIL # BLD: 0 K/UL (ref 0–0.4)
EOSINOPHIL NFR BLD: 0 % (ref 0–7)
EPITH CASTS URNS QL MICRO: ABNORMAL /LPF
ERYTHROCYTE [DISTWIDTH] IN BLOOD BY AUTOMATED COUNT: 13.4 % (ref 11.5–14.5)
GLOBULIN SER CALC-MCNC: 4.1 G/DL (ref 2–4)
GLUCOSE SERPL-MCNC: 85 MG/DL (ref 65–100)
GLUCOSE UR STRIP.AUTO-MCNC: NEGATIVE MG/DL
HCT VFR BLD AUTO: 45.4 % (ref 36.6–50.3)
HGB BLD-MCNC: 15.4 G/DL (ref 12.1–17)
HGB UR QL STRIP: NEGATIVE
HYALINE CASTS URNS QL MICRO: ABNORMAL /LPF (ref 0–5)
IMM GRANULOCYTES # BLD: 0 K/UL (ref 0–0.04)
IMM GRANULOCYTES NFR BLD AUTO: 0 % (ref 0–0.5)
KETONES UR QL STRIP.AUTO: NEGATIVE MG/DL
LEUKOCYTE ESTERASE UR QL STRIP.AUTO: NEGATIVE
LYMPHOCYTES # BLD: 2.4 K/UL (ref 0.8–3.5)
LYMPHOCYTES NFR BLD: 29 % (ref 12–49)
MCH RBC QN AUTO: 31.8 PG (ref 26–34)
MCHC RBC AUTO-ENTMCNC: 33.9 G/DL (ref 30–36.5)
MCV RBC AUTO: 93.8 FL (ref 80–99)
MONOCYTES # BLD: 0.7 K/UL (ref 0–1)
MONOCYTES NFR BLD: 8 % (ref 5–13)
MUCOUS THREADS URNS QL MICRO: ABNORMAL /LPF
NEUTS SEG # BLD: 5 K/UL (ref 1.8–8)
NEUTS SEG NFR BLD: 62 % (ref 32–75)
NITRITE UR QL STRIP.AUTO: NEGATIVE
NRBC # BLD: 0 K/UL (ref 0–0.01)
NRBC BLD-RTO: 0 PER 100 WBC
P-R INTERVAL, ECG05: 174 MS
PH UR STRIP: 5 [PH] (ref 5–8)
PLATELET # BLD AUTO: 271 K/UL (ref 150–400)
PMV BLD AUTO: 9.1 FL (ref 8.9–12.9)
POTASSIUM SERPL-SCNC: 4.2 MMOL/L (ref 3.5–5.1)
PROT SERPL-MCNC: 7.7 G/DL (ref 6.4–8.2)
PROT UR STRIP-MCNC: NEGATIVE MG/DL
Q-T INTERVAL, ECG07: 432 MS
QRS DURATION, ECG06: 82 MS
QTC CALCULATION (BEZET), ECG08: 393 MS
RBC # BLD AUTO: 4.84 M/UL (ref 4.1–5.7)
RBC #/AREA URNS HPF: ABNORMAL /HPF (ref 0–5)
SODIUM SERPL-SCNC: 137 MMOL/L (ref 136–145)
SP GR UR REFRACTOMETRY: 1.02 (ref 1–1.03)
TROPONIN I SERPL-MCNC: <0.04 NG/ML
UR CULT HOLD, URHOLD: NORMAL
UROBILINOGEN UR QL STRIP.AUTO: 0.2 EU/DL (ref 0.2–1)
VENTRICULAR RATE, ECG03: 50 BPM
WBC # BLD AUTO: 8.1 K/UL (ref 4.1–11.1)
WBC URNS QL MICRO: ABNORMAL /HPF (ref 0–4)

## 2018-05-07 PROCEDURE — 84484 ASSAY OF TROPONIN QUANT: CPT | Performed by: PHYSICIAN ASSISTANT

## 2018-05-07 PROCEDURE — 96360 HYDRATION IV INFUSION INIT: CPT

## 2018-05-07 PROCEDURE — 36415 COLL VENOUS BLD VENIPUNCTURE: CPT | Performed by: EMERGENCY MEDICINE

## 2018-05-07 PROCEDURE — 99284 EMERGENCY DEPT VISIT MOD MDM: CPT

## 2018-05-07 PROCEDURE — 85025 COMPLETE CBC W/AUTO DIFF WBC: CPT | Performed by: EMERGENCY MEDICINE

## 2018-05-07 PROCEDURE — 80053 COMPREHEN METABOLIC PANEL: CPT | Performed by: PHYSICIAN ASSISTANT

## 2018-05-07 PROCEDURE — 93005 ELECTROCARDIOGRAM TRACING: CPT

## 2018-05-07 PROCEDURE — 81001 URINALYSIS AUTO W/SCOPE: CPT | Performed by: PHYSICIAN ASSISTANT

## 2018-05-07 PROCEDURE — 74011250636 HC RX REV CODE- 250/636: Performed by: EMERGENCY MEDICINE

## 2018-05-07 PROCEDURE — 71046 X-RAY EXAM CHEST 2 VIEWS: CPT

## 2018-05-07 RX ADMIN — SODIUM CHLORIDE 1000 ML: 900 INJECTION, SOLUTION INTRAVENOUS at 12:06

## 2018-05-07 NOTE — ED TRIAGE NOTES
Pt stated his bp was 85/58 this am, pt c/o feeling tired, denies fever or chills, denies cp or abd pain, denies sob, +legs feel tired , home bp machine in triage reading 131/71

## 2018-05-07 NOTE — ED NOTES
I have reviewed discharge instructions with the patient. The patient verbalized understanding. The patient was able to ambulate with steady gait.

## 2018-05-07 NOTE — ED NOTES
10:11 AM  I have evaluated the patient as the Provider in Triage. I have reviewed His vital signs and the triage nurse assessment. I have talked with the patient and any available family and advised that I am the provider in triage and have ordered the appropriate study to initiate their work up based on the clinical presentation during my assessment. I have advised that the patient will be accommodated in the Main ED as soon as possible. I have also requested to contact the triage nurse or myself immediately if the patient experiences any changes in their condition during this brief waiting period. Pt here for fatigue, decreased energy starting this AM.  Low BP at home. Denies fever, chills, cough, vomiting, or other symptoms. Seen here 4/9 for same, notes that he was diagnosed with dehydration, improved with IV fluids.     ROULA Glover

## 2018-05-07 NOTE — ED PROVIDER NOTES
HPI Comments: 81 y.o. male with past medical history significant for lumbar stenosis who presents via EMS from home accompanied by his wife with chief complaint of low blood pressure. Patient states he awakened in usual state of health and took his dog for a walk. Upon returning, patient took Claritin to treat seasonal allergies. He noticed bilateral calves with \"tightening\" sensation and subsequently took BP on home machine. Patient noted he was hypotensive at 86/58. Patient endorses history of the symptoms for which he has been evaluated in the ED (see below) and diagnosed with dehydration, so wife gave patient 2 glasses of water. Repeat BP unchanged after water, prompting visit to the ED. Of note, patient states antihypertensives were discontinued prior to back surgery in 2/2018. His BP was noted to be persistently hypotensive throughout f/u, so antihypertensives were never restarted by Dr. Luda Lau, PCP. Patient notes persistent hypotension correlates with starting Claritin and suspects this may be related. His home machine has been correlated with PCP's machine. Patient specifically denies chest pain, neck pain, shoulder pain, arm pain, nausea, vomiting, diaphoresis, dizziness and lightheadedness. There are no other acute medical concerns at this time. Old Chart Review:  Patient evaluated in the ED on 4/9/18 with hypotension related to dehydration. Patient improved with IV fluids. Social hx: never tobacco smoker; rare EtOH use; denies illicit drug use;   PCP: Adam Marshall MD  Neurosurgery: Agus Stinson MD    Note written by Georgiana Thurman, as dictated by Caryle Marines, MD 12:00 PM        The history is provided by the patient, the spouse and medical records (wife). No  was used.         Past Medical History:   Diagnosis Date    Arthritis     Bleeds easily ([address]     Chronic pain     Essential hypertension     Lumbar stenosis with neurogenic claudication 2/8/2018    Skin cancer     BCCA; PROSTATE       Past Surgical History:   Procedure Laterality Date    [facility]    HX ORTHOPAEDIC      back surgery     HX PROSTATECTOMY  2002         Family History:   Problem Relation Age of Onset    Anesth Problems Neg Hx        Social History     Social History    Marital status:      Spouse name: N/A    Number of children: N/A    Years of education: N/A     Occupational History    Not on file. Social History Main Topics    Smoking status: Never Smoker    Smokeless tobacco: Never Used    Alcohol use Yes      Comment: on special occasions    Drug use: No    Sexual activity: Not on file     Other Topics Concern    Not on file     Social History Narrative         ALLERGIES: Amoxicillin; Hydrocodone; Penicillins; and Percocet [oxycodone-acetaminophen]    Review of Systems   Constitutional: Negative for chills, diaphoresis and fever. HENT: Negative for congestion, postnasal drip, rhinorrhea and sore throat. Eyes: Negative for photophobia, discharge, redness and visual disturbance. Respiratory: Negative for cough, chest tightness, shortness of breath and wheezing. Cardiovascular: Negative for chest pain, palpitations and leg swelling. Gastrointestinal: Negative for abdominal distention, abdominal pain, blood in stool, constipation, diarrhea, nausea and vomiting. Genitourinary: Negative for difficulty urinating, dysuria, frequency, hematuria and urgency. Musculoskeletal: Positive for myalgias (bilateral calves). Negative for arthralgias, back pain, joint swelling and neck pain. Skin: Negative for color change and rash. Neurological: Negative for dizziness, speech difficulty, light-headedness and headaches. Psychiatric/Behavioral: The patient is not nervous/anxious. All other systems reviewed and are negative.       Vitals:    05/07/18 1012 05/07/18 1145 05/07/18 1200   BP: 139/74 145/65 148/81   Pulse: (!) 57 (!) 50 (!) 53   Resp: 16 22 18   Temp: 97.3 °F (36.3 °C)     SpO2: 100% 100% 100%   Height: 5' 9\" (1.753 m)              Physical Exam   Constitutional: He is oriented to person, place, and time. He appears well-developed and well-nourished. No distress. HENT:   Head: Normocephalic and atraumatic. Right Ear: External ear normal.   Left Ear: External ear normal.   Nose: Nose normal.   Mouth/Throat: Oropharynx is clear and moist.   Eyes: Conjunctivae and EOM are normal. Pupils are equal, round, and reactive to light. No scleral icterus. Neck: Normal range of motion. Neck supple. No JVD present. No tracheal deviation present. No thyromegaly present. Cardiovascular: Normal rate, regular rhythm and normal heart sounds. Exam reveals no gallop and no friction rub. No murmur heard. Pulmonary/Chest: Effort normal and breath sounds normal. No respiratory distress. He has no wheezes. He has no rales. He exhibits no tenderness. Abdominal: Soft. Bowel sounds are normal. He exhibits no distension and no mass. There is no tenderness. There is no rebound and no guarding. Musculoskeletal: Normal range of motion. He exhibits no edema or tenderness. Lymphadenopathy:     He has no cervical adenopathy. Neurological: He is alert and oriented to person, place, and time. He has normal strength. He displays no atrophy and no tremor. No cranial nerve deficit. He exhibits normal muscle tone. Coordination and gait normal.   Skin: Skin is warm and dry. No rash noted. He is not diaphoretic. No erythema. Psychiatric: He has a normal mood and affect. His behavior is normal. Judgment and thought content normal.   Nursing note and vitals reviewed.      Note written by Georgiana Funes, as dictated by Noé Sparks MD 12:00 PM    MDM  Number of Diagnoses or Management Options  Hypotension due to drugs:   Diagnosis management comments: MAURI  Impression: A 32-year-old male presenting to the emergency department episode of hypotension. Of note during the history taking it has become apparent that he is having hypotensive episodes which seemed to at least be partially correlated to his use of Claritin. Plan of care with baseline labs, IV fluids, will as the patient to DC his Claritin and used Flonase instead. ED Course       Procedures    ED EKG Interpretation:  Rhythm: sinus bradycardia and irregular. Rate (approx.): 50; Axis: normal; Intervals: normal; ST/T wave: normal.  Note written by Georgiana Benjamin, as dictated by Margarita Escobar MD 11:18 AM    12:10 PM  CBC, CMP unremarkable  Troponin negative  UA negative  Chest x-ray reveals no acute process or change compared to the prior exam.      1:07 PM  Improved with IV fluids. Likely related to Claritin use. Discussed available lab and imaging results with patient and wife. Both verbalize understanding and agree with care plan. Will discharge patient home with specific return precautions; discontinue Claritin in favor of Flonase; f/u with PCP, [facility] ED as needed. Patient's results have been reviewed with them. Patient and/or family have verbally conveyed their understanding and agreement of the patient's signs, symptoms, diagnosis, treatment and prognosis and additionally agree to follow up as recommended or return to the Emergency Room should their condition change prior to follow-up. Discharge instructions have also been provided to the patient with some educational information regarding their diagnosis as well a list of reasons why they would want to return to the ER prior to their follow-up appointment should their condition change.

## 2018-05-07 NOTE — ED NOTES
Verbal shift change report given to Darius Pat RN (oncoming nurse) by William Bhatt RN  (offgoing nurse). Report included the following information SBAR, ED Summary and Recent Results. MD at bedside.

## 2019-01-23 ENCOUNTER — HOSPITAL ENCOUNTER (OUTPATIENT)
Dept: MRI IMAGING | Age: 84
Discharge: HOME | End: 2019-01-23
Attending: NEUROLOGICAL SURGERY
Payer: MEDICARE

## 2019-01-23 DIAGNOSIS — M48.062 PSEUDOCLAUDICATION SYNDROME: ICD-10-CM

## 2019-01-23 PROCEDURE — 72148 MRI LUMBAR SPINE W/O DYE: CPT

## 2019-08-14 ENCOUNTER — HOSPITAL ENCOUNTER (OUTPATIENT)
Dept: MRI IMAGING | Age: 84
Discharge: HOME | End: 2019-08-14
Attending: NEUROLOGICAL SURGERY
Payer: MEDICARE

## 2019-08-14 DIAGNOSIS — R26.81 GAIT INSTABILITY: ICD-10-CM

## 2019-08-14 PROCEDURE — 72146 MRI CHEST SPINE W/O DYE: CPT

## 2019-08-14 PROCEDURE — 72141 MRI NECK SPINE W/O DYE: CPT

## 2022-03-19 PROBLEM — M48.062 LUMBAR STENOSIS WITH NEUROGENIC CLAUDICATION: Status: ACTIVE | Noted: 2018-02-08

## 2022-04-26 ENCOUNTER — HOSPITAL ENCOUNTER (EMERGENCY)
Age: 87
Discharge: HOME | End: 2022-04-26
Attending: EMERGENCY MEDICINE | Admitting: EMERGENCY MEDICINE
Payer: MEDICARE

## 2022-04-26 ENCOUNTER — APPOINTMENT (OUTPATIENT)
Dept: GENERAL RADIOLOGY | Age: 87
End: 2022-04-26
Attending: EMERGENCY MEDICINE
Payer: MEDICARE

## 2022-04-26 VITALS
BODY MASS INDEX: 25.48 KG/M2 | SYSTOLIC BLOOD PRESSURE: 168 MMHG | DIASTOLIC BLOOD PRESSURE: 70 MMHG | RESPIRATION RATE: 16 BRPM | TEMPERATURE: 97.6 F | HEIGHT: 69 IN | OXYGEN SATURATION: 97 % | HEART RATE: 59 BPM | WEIGHT: 172 LBS

## 2022-04-26 DIAGNOSIS — R25.1 SHAKY: ICD-10-CM

## 2022-04-26 DIAGNOSIS — R07.9 CHEST PAIN, UNSPECIFIED TYPE: Primary | ICD-10-CM

## 2022-04-26 LAB
ALBUMIN SERPL-MCNC: 4 G/DL (ref 3.5–5)
ALBUMIN/GLOB SERPL: 1.1 {RATIO} (ref 1.1–2.2)
ALP SERPL-CCNC: 100 U/L (ref 45–117)
ALT SERPL-CCNC: 25 U/L (ref 12–78)
ANION GAP SERPL CALC-SCNC: 7 MMOL/L (ref 5–15)
AST SERPL-CCNC: 18 U/L (ref 15–37)
BASOPHILS # BLD: 0.1 K/UL (ref 0–0.1)
BASOPHILS NFR BLD: 1 % (ref 0–1)
BDY SITE: ABNORMAL
BILIRUB SERPL-MCNC: 0.7 MG/DL (ref 0.2–1)
BUN SERPL-MCNC: 16 MG/DL (ref 6–20)
BUN/CREAT SERPL: 16 (ref 12–20)
CALCIUM SERPL-MCNC: 9.7 MG/DL (ref 8.5–10.1)
CALCULATED R AXIS, ECG10: 10 DEGREES
CALCULATED T AXIS, ECG11: 13 DEGREES
CHLORIDE SERPL-SCNC: 110 MMOL/L (ref 97–108)
CO2 SERPL-SCNC: 23 MMOL/L (ref 21–32)
COHGB MFR BLD: 1 % (ref 1–2)
COMMENT, HOLDF: NORMAL
CREAT SERPL-MCNC: 1.02 MG/DL (ref 0.7–1.3)
DIAGNOSIS, 93000: NORMAL
DIFFERENTIAL METHOD BLD: NORMAL
EOSINOPHIL # BLD: 0 K/UL (ref 0–0.4)
EOSINOPHIL NFR BLD: 0 % (ref 0–7)
ERYTHROCYTE [DISTWIDTH] IN BLOOD BY AUTOMATED COUNT: 13.3 % (ref 11.5–14.5)
GLOBULIN SER CALC-MCNC: 3.7 G/DL (ref 2–4)
GLUCOSE SERPL-MCNC: 94 MG/DL (ref 65–100)
HCT VFR BLD AUTO: 40.4 % (ref 36.6–50.3)
HGB BLD OXIMETRY-MCNC: 13.1 G/DL (ref 14–17)
HGB BLD-MCNC: 13.4 G/DL (ref 12.1–17)
IMM GRANULOCYTES # BLD AUTO: 0 K/UL (ref 0–0.04)
IMM GRANULOCYTES NFR BLD AUTO: 0 % (ref 0–0.5)
LYMPHOCYTES # BLD: 2.7 K/UL (ref 0.8–3.5)
LYMPHOCYTES NFR BLD: 33 % (ref 12–49)
MCH RBC QN AUTO: 30.9 PG (ref 26–34)
MCHC RBC AUTO-ENTMCNC: 33.2 G/DL (ref 30–36.5)
MCV RBC AUTO: 93.3 FL (ref 80–99)
METHGB MFR BLD: 0.2 % (ref 0–1.4)
MONOCYTES # BLD: 0.7 K/UL (ref 0–1)
MONOCYTES NFR BLD: 9 % (ref 5–13)
NEUTS SEG # BLD: 4.6 K/UL (ref 1.8–8)
NEUTS SEG NFR BLD: 57 % (ref 32–75)
NRBC # BLD: 0 K/UL (ref 0–0.01)
NRBC BLD-RTO: 0 PER 100 WBC
OXYHGB MFR BLD: 33.5 % (ref 94–97)
PLATELET # BLD AUTO: 272 K/UL (ref 150–400)
PMV BLD AUTO: 10.5 FL (ref 8.9–12.9)
POTASSIUM SERPL-SCNC: 4.2 MMOL/L (ref 3.5–5.1)
PROT SERPL-MCNC: 7.7 G/DL (ref 6.4–8.2)
Q-T INTERVAL, ECG07: 444 MS
QRS DURATION, ECG06: 82 MS
QTC CALCULATION (BEZET), ECG08: 428 MS
RBC # BLD AUTO: 4.33 M/UL (ref 4.1–5.7)
SAMPLES BEING HELD,HOLD: NORMAL
SAO2 % BLD: 34 % (ref 95–99)
SODIUM SERPL-SCNC: 140 MMOL/L (ref 136–145)
SPECIMEN SITE: ABNORMAL
TROPONIN-HIGH SENSITIVITY: 18 NG/L (ref 0–76)
TROPONIN-HIGH SENSITIVITY: 18 NG/L (ref 0–76)
VENTRICULAR RATE, ECG03: 56 BPM
WBC # BLD AUTO: 8.1 K/UL (ref 4.1–11.1)

## 2022-04-26 PROCEDURE — 82375 ASSAY CARBOXYHB QUANT: CPT

## 2022-04-26 PROCEDURE — 93005 ELECTROCARDIOGRAM TRACING: CPT

## 2022-04-26 PROCEDURE — 71046 X-RAY EXAM CHEST 2 VIEWS: CPT

## 2022-04-26 PROCEDURE — 99285 EMERGENCY DEPT VISIT HI MDM: CPT

## 2022-04-26 PROCEDURE — 85025 COMPLETE CBC W/AUTO DIFF WBC: CPT

## 2022-04-26 PROCEDURE — 84484 ASSAY OF TROPONIN QUANT: CPT

## 2022-04-26 PROCEDURE — 36415 COLL VENOUS BLD VENIPUNCTURE: CPT

## 2022-04-26 PROCEDURE — 80053 COMPREHEN METABOLIC PANEL: CPT

## 2022-04-26 RX ORDER — VITAMIN E CAP 100 UNIT 100 UNIT
100 CAP ORAL DAILY
COMMUNITY

## 2022-04-26 NOTE — ED PROVIDER NOTES
Date of Service:  4/26/2022    Patient:  Toño Galdamez    Chief Complaint:  Lethargy       HPI:  Toño Galdamez is a 81 y.o.  male who presents for evaluation of reported lethargy. Patient describes his symptoms as dizzy. When he is describing his symptoms he notes that he has felt shaky. States that yesterday he started feeling shaky and somewhat weak. This morning he did have some minimal left-sided chest pain that has since resolved, 6 out of 10 discomfort. No associated symptoms with it. States that recently he has felt like his legs have been giving out. He denies any shortness of breath abdominal pain nausea vomiting diarrhea headaches fevers chills. [facility]ady[facility], arrives with his heart rate in the low 50s.   No other acute complaints           Past Medical History:   Diagnosis Date    Arthritis     Bleeds easily ([address]     Chronic pain     Essential hypertension     Lumbar stenosis with neurogenic claudication 2/8/2018    Skin cancer     BCCA; PROSTATE       Past Surgical History:   Procedure Laterality Date    [facility]    HX ORTHOPAEDIC      back surgery     HX PROSTATECTOMY  2002         Family History:   Problem Relation Age of Onset    Anesth Problems Neg Hx        Social History     Socioeconomic History    Marital status:      Spouse name: Not on file    Number of children: Not on file    Years of education: Not on file    Highest education level: Not on file   Occupational History    Not on file   Tobacco Use    Smoking status: Never Smoker    Smokeless tobacco: Never Used   Substance and Sexual Activity    Alcohol use: Yes     Comment: on special occasions    Drug use: No    Sexual activity: Not on file   Other Topics Concern    Not on file   Social History Narrative    Not on file     Social Determinants of Health     Financial Resource Strain:     Difficulty of Paying Living Expenses: Not on file   Food Insecurity:     Worried About [address] in the Last Year: Not on file    Lisa of Food in the Last Year: Not on file   Transportation Needs:     Lack of Transportation (Medical): Not on file    Lack of Transportation (Non-Medical): Not on file   Physical Activity:     Days of Exercise per Week: Not on file    Minutes of Exercise per Session: Not on file   Stress:     Feeling of Stress : Not on file   Social Connections:     Frequency of Communication with Friends and Family: Not on file    Frequency of Social Gatherings with Friends and Family: Not on file    Attends [employer] Services: Not on file    Active Member of [address] [place] [address] or Organizations: Not on file    Attends Club or Organization Meetings: Not on file    Marital Status: Not on file   Intimate Partner Violence:     Fear of Current or Ex-Partner: Not on file    Emotionally Abused: Not on file    Physically Abused: Not on file    Sexually Abused: Not on file   Housing Stability:     Unable to Pay for Housing in the Last Year: Not on file    Number of [place] in the Last Year: Not on file    Unstable Housing in the Last Year: Not on file         ALLERGIES: Amoxicillin, Hydrocodone, Penicillins, and Percocet [oxycodone-acetaminophen]    Review of Systems   Cardiovascular: Positive for chest pain. Neurological: Positive for dizziness and weakness. All other systems reviewed and are negative. Vitals:    04/26/22 1452 04/26/22 1514   BP: (!) 149/68 130/60   Pulse: (!) 52 (!) 52   Resp: 18 18   Temp: 97.5 °F (36.4 °C)    SpO2: 98% 99%   Weight: 78 kg (172 lb)    Height: 5' 9\" (1.753 m)             Physical Exam  Vitals and nursing note reviewed. Constitutional:       Appearance: Normal appearance. HENT:      Head: Normocephalic and atraumatic. Nose: Nose normal.      Mouth/Throat:      Mouth: Mucous membranes are moist.   Eyes:      General: No scleral icterus.   Cardiovascular: Rate and Rhythm: Regular rhythm. Bradycardia present. Pulses: Normal pulses. Pulmonary:      Effort: Pulmonary effort is normal.      Breath sounds: Normal breath sounds. Abdominal:      General: Abdomen is flat. Musculoskeletal:         General: No deformity. Skin:     General: Skin is warm. Capillary Refill: Capillary refill takes less than 2 seconds. Neurological:      Mental Status: He is alert and oriented to person, place, and time. Psychiatric:         Mood and Affect: Mood normal.          [facility]  ED Course as of 04/26/22 1519   Tue Apr 26, 2022   1501 EKG 1449  Bradycardia at 56 bpm.  Possible junctional rhythm Normal axis. No STEMI [GG]      ED Course User Index  [GG] Jeana Rosenbaumrubyramon      VITAL SIGNS:  Patient Vitals for the past 4 hrs:   Temp Pulse Resp BP SpO2   04/26/22 1821 97.6 °F (36.4 °C) (!) 59 16 (!) 168/70 97 %   04/26/22 1721  (!) 58 16 130/66 99 %         LABS:  Recent Results (from the past 6 hour(s))   EKG, 12 LEAD, INITIAL    Collection Time: 04/26/22  2:49 PM   Result Value Ref Range    Ventricular Rate 56 BPM    QRS Duration 82 ms    Q-T Interval 444 ms    QTC Calculation (Bezet) 428 ms    Calculated R Axis 10 degrees    Calculated T Axis 13 degrees    Diagnosis       Normal sinus rhythm with short KS  Abnormal ECG  When compared with ECG of 07-MAY-2018 11:17,  Junctional rhythm has replaced Sinus rhythm  T wave inversion now evident in Inferior leads  Confirmed by Saul Dubois MD ([id number]) on 4/26/2022 3:33:31 PM     SAMPLES BEING HELD    Collection Time: 04/26/22  3:02 PM   Result Value Ref Range    SAMPLES BEING HELD 1RED 1BLU     COMMENT        Add-on orders for these samples will be processed based on acceptable specimen integrity and analyte stability, which may vary by analyte.    CBC WITH AUTOMATED DIFF    Collection Time: 04/26/22  3:02 PM   Result Value Ref Range    WBC 8.1 4.1 - 11.1 K/uL    RBC 4.33 4.10 - 5.70 M/uL    HGB 13.4 12.1 - 17.0 g/dL HCT 40.4 36.6 - 50.3 %    MCV 93.3 80.0 - 99.0 FL    MCH 30.9 26.0 - 34.0 PG    MCHC 33.2 30.0 - 36.5 g/dL    RDW 13.3 11.5 - 14.5 %    PLATELET 398 884 - 993 K/uL    MPV 10.5 8.9 - 12.9 FL    NRBC 0.0 0  WBC    ABSOLUTE NRBC 0.00 0.00 - 0.01 K/uL    NEUTROPHILS 57 32 - 75 %    LYMPHOCYTES 33 12 - 49 %    MONOCYTES 9 5 - 13 %    EOSINOPHILS 0 0 - 7 %    BASOPHILS 1 0 - 1 %    IMMATURE GRANULOCYTES 0 0.0 - 0.5 %    ABS. NEUTROPHILS 4.6 1.8 - 8.0 K/UL    ABS. LYMPHOCYTES 2.7 0.8 - 3.5 K/UL    ABS. MONOCYTES 0.7 0.0 - 1.0 K/UL    ABS. EOSINOPHILS 0.0 0.0 - 0.4 K/UL    ABS. BASOPHILS 0.1 0.0 - 0.1 K/UL    ABS. IMM. GRANS. 0.0 0.00 - 0.04 K/UL    DF AUTOMATED     METABOLIC PANEL, COMPREHENSIVE    Collection Time: 04/26/22  3:02 PM   Result Value Ref Range    Sodium 140 136 - 145 mmol/L    Potassium 4.2 3.5 - 5.1 mmol/L    Chloride 110 (H) 97 - 108 mmol/L    CO2 23 21 - 32 mmol/L    Anion gap 7 5 - 15 mmol/L    Glucose 94 65 - 100 mg/dL    BUN 16 6 - 20 MG/DL    Creatinine 1.02 0.70 - 1.30 MG/DL    BUN/Creatinine ratio 16 12 - 20      GFR est AA >60 >60 ml/min/1.73m2    GFR est non-AA >60 >60 ml/min/1.73m2    Calcium 9.7 8.5 - 10.1 MG/DL    Bilirubin, total 0.7 0.2 - 1.0 MG/DL    ALT (SGPT) 25 12 - 78 U/L    AST (SGOT) 18 15 - 37 U/L    Alk.  phosphatase 100 45 - 117 U/L    Protein, total 7.7 6.4 - 8.2 g/dL    Albumin 4.0 3.5 - 5.0 g/dL    Globulin 3.7 2.0 - 4.0 g/dL    A-G Ratio 1.1 1.1 - 2.2     TROPONIN-HIGH SENSITIVITY    Collection Time: 04/26/22  3:02 PM   Result Value Ref Range    Troponin-High Sensitivity 18 0 - 76 ng/L   CARBOXYHEMOGLOBIN    Collection Time: 04/26/22  5:11 PM   Result Value Ref Range    Carboxy-Hgb 1.0 1 - 2 %    Methemoglobin 0.2 0 - 1.4 %    tHb 13.1 (L) 14 - 17 g/dL    Oxyhemoglobin 33.5 (LL) 94 - 97 %    O2 SATURATION 34 (L) 95 - 99 %    SITE OTHER      Sample source VENOUS BLOOD     TROPONIN-HIGH SENSITIVITY    Collection Time: 04/26/22  5:33 PM   Result Value Ref Range Troponin-High Sensitivity 18 0 - 76 ng/L        IMAGING:  XR CHEST PA LAT   Final Result   No acute findings. Medications During Visit:  Medications - No data to display      DECISION MAKING:  Elvin Stern is a 81 y.o. male who comes in as above. Here, patient appears well. He is somewhat bradycardic in the low 50s but asymptomatic. He does not describe any type of lightheadedness or dizziness, rather stating that he feels shaky sometimes. Here, troponin x2 areas unremarkable. Patient has gotten up and ambulated and is what is described as his baseline by his family members at the bedside. ,  Patient feels comfortable going home. Patient to be discharged home, follow-up with cardiology. Return as needed. Discussed at length the patient's heart rate with the family. Its been in the 50s the entire time he was here, it does bump up into the 60s and 70s when he is up ambulating quickly come back to the 50s when he still. He is asymptomatic. I feel comfortable to discharge him. He is in a sinus rhythm at disposition      IMPRESSION:  1. Chest pain, unspecified type    2. Shaky        DISPOSITION:  Discharged      Discharge Medication List as of 4/26/2022  6:40 PM           Follow-up Information     Follow up With Specialties Details Why Contact Anitra Garcia MD Family Medicine Schedule an appointment as soon as possible for a visit   [address] [place] [id number] [phone]      Naveen Coto MD Cardiology Schedule an appointment as soon as possible for a visit   [address] [phone]              The patient is asked to follow-up with their primary care provider in the next several days. They are to call tomorrow for an appointment. The patient is asked to return promptly for any increased concerns or worsening of symptoms.   They can return to this emergency department or any other emergency department.     Procedures

## 2022-04-26 NOTE — ED NOTES
Pt ambulated with walker without difficulty. Pt reports he feels like he is walking normally. Family member at bedside agrees.

## 2022-04-26 NOTE — ED TRIAGE NOTES
Pt arrives via ems from home c/o weakness for 12-24hrs. Pt reports having chest pain last night and some intermittent dizziness. EMS reports HR 40s-50s. Pt A&Ox4. Ambulatory to stretcher from EMS stretcher. Pt reports falling while walking his dog this week. \"my legs gave out\" pt reports using his cane while ambulating. Pt was seen by dispatch health prior to arrival, labs at bedside.

## 2022-07-28 NOTE — DISCHARGE INSTRUCTIONS
Low Blood Pressure: Care Instructions  Your Care Instructions    Blood pressure is a measurement of the force of the blood against the walls of the blood vessels during and after each beat of the heart. Low blood pressure is also called hypotension. It means that your blood pressure is much lower than normal. Some people, especially young, slim women, may have slightly low blood pressure without symptoms. But in many people, low blood pressure can cause symptoms such as feeling dizzy or lightheaded. When your blood pressure is too low, your heart, brain, and other organs do not get enough blood. Low blood pressure can be caused by many things, including heart problems and some medicines. Diabetes that is not under control can cause your blood pressure to drop. And so can a severe allergic reaction or infection. Another cause is dehydration, which is when your body loses too much fluid. Treatment for low blood pressure depends on the cause. Follow-up care is a key part of your treatment and safety. Be sure to make and go to all appointments, and call your doctor if you are having problems. It's also a good idea to know your test results and keep a list of the medicines you take. How can you care for yourself at home? · Drink plenty of fluids, enough so that your urine is light yellow or clear like water. If you have kidney, heart, or liver disease and have to limit fluids, talk with your doctor before you increase the amount of fluids you drink. · Be safe with medicines. Call your doctor if you think you are having a problem with your medicine. You will get more details on the specific medicines your doctor prescribes. · Stand up or get out of bed very slowly to allow your body to adjust.  · Get plenty of rest.  · Do not smoke. Smoking increases your risk of heart attack. If you need help quitting, talk to your doctor about stop-smoking programs and medicines.  These can increase your chances of quitting for good. · Limit alcohol to 2 drinks a day for men and 1 drink a day for women. Alcohol may interfere with your medicine. In addition, alcohol can make your low blood pressure worse by causing your body to lose water. When should you call for help? Call 911 anytime you think you may need emergency care. For example, call if:  ? · You passed out (lost consciousness). ?Call your doctor now or seek immediate medical care if:  ? · You are dizzy or lightheaded, or you feel like you may faint. ? Watch closely for changes in your health, and be sure to contact your doctor if you have any problems. Where can you learn more? Go to http://roopa-suzanne.info/. Enter C304 in the search box to learn more about \"Low Blood Pressure: Care Instructions. \"  Current as of: September 21, 2016  Content Version: 11.4  © 9128-1026 [employer]. Care instructions adapted under license by [employer] (which disclaims liability or warranty for this information). If you have questions about a medical condition or this instruction, always ask your healthcare professional. [address] any warranty or liability for your use of this information. declines

## 2024-02-21 ENCOUNTER — APPOINTMENT (OUTPATIENT)
Facility: HOSPITAL | Age: 89
DRG: 566 | End: 2024-02-21
Payer: MEDICARE

## 2024-02-21 ENCOUNTER — HOSPITAL ENCOUNTER (INPATIENT)
Facility: HOSPITAL | Age: 89
LOS: 5 days | Discharge: NURSING, REHAB OR LONG-TERM CARE | DRG: 566 | End: 2024-02-26
Attending: STUDENT IN AN ORGANIZED HEALTH CARE EDUCATION/TRAINING PROGRAM | Admitting: INTERNAL MEDICINE
Payer: MEDICARE

## 2024-02-21 DIAGNOSIS — M25.551 RIGHT HIP PAIN: ICD-10-CM
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
## 2024-02-21 DIAGNOSIS — I48.91 NEW ONSET ATRIAL FIBRILLATION (HCC): ICD-10-CM

## 2024-02-21 DIAGNOSIS — M62.82 NON-TRAUMATIC RHABDOMYOLYSIS: ICD-10-CM

## 2024-02-21 LAB
ALBUMIN SERPL-MCNC: 3.6 G/DL (ref 3.5–5)
ALBUMIN/GLOB SERPL: 0.9 (ref 1.1–2.2)
ALP SERPL-CCNC: 90 U/L (ref 45–117)
ALT SERPL-CCNC: 22 U/L (ref 12–78)
ANION GAP SERPL CALC-SCNC: 4 MMOL/L (ref 5–15)
APPEARANCE UR: CLEAR
AST SERPL-CCNC: 38 U/L (ref 15–37)
BACTERIA URNS QL MICRO: NEGATIVE /HPF
BASOPHILS # BLD: 0.1 K/UL (ref 0–0.1)
BASOPHILS NFR BLD: 0 % (ref 0–1)
BILIRUB SERPL-MCNC: 0.9 MG/DL (ref 0.2–1)
BILIRUB UR QL: NEGATIVE
BUN SERPL-MCNC: 16 MG/DL (ref 6–20)
BUN/CREAT SERPL: 12 (ref 12–20)
CALCIUM SERPL-MCNC: 9.3 MG/DL (ref 8.5–10.1)
CHLORIDE SERPL-SCNC: 108 MMOL/L (ref 97–108)
CK SERPL-CCNC: 1748 U/L (ref 39–308)
CO2 SERPL-SCNC: 27 MMOL/L (ref 21–32)
COLOR UR: ABNORMAL
COMMENT:: NORMAL
CREAT SERPL-MCNC: 1.38 MG/DL (ref 0.7–1.3)
DIFFERENTIAL METHOD BLD: ABNORMAL
EKG DIAGNOSIS: NORMAL
EKG Q-T INTERVAL: 376 MS
EKG QRS DURATION: 76 MS
EKG QTC CALCULATION (BAZETT): 449 MS
EKG R AXIS: -7 DEGREES
EKG T AXIS: 60 DEGREES
EKG VENTRICULAR RATE: 86 BPM
EOSINOPHIL # BLD: 0 K/UL (ref 0–0.4)
EOSINOPHIL NFR BLD: 0 % (ref 0–7)
EPITH CASTS URNS QL MICRO: ABNORMAL /LPF
ERYTHROCYTE [DISTWIDTH] IN BLOOD BY AUTOMATED COUNT: 13.2 % (ref 11.5–14.5)
GLOBULIN SER CALC-MCNC: 3.9 G/DL (ref 2–4)
GLUCOSE SERPL-MCNC: 121 MG/DL (ref 65–100)
GLUCOSE UR STRIP.AUTO-MCNC: NEGATIVE MG/DL
HCT VFR BLD AUTO: 40.8 % (ref 36.6–50.3)
HGB BLD-MCNC: 14.1 G/DL (ref 12.1–17)
HGB UR QL STRIP: ABNORMAL
HYALINE CASTS URNS QL MICRO: ABNORMAL /LPF (ref 0–5)
IMM GRANULOCYTES # BLD AUTO: 0.1 K/UL (ref 0–0.04)
IMM GRANULOCYTES NFR BLD AUTO: 1 % (ref 0–0.5)
INR PPP: 1 (ref 0.9–1.1)
KETONES UR QL STRIP.AUTO: 15 MG/DL
LEUKOCYTE ESTERASE UR QL STRIP.AUTO: NEGATIVE
LYMPHOCYTES # BLD: 0.9 K/UL (ref 0.8–3.5)
LYMPHOCYTES NFR BLD: 5 % (ref 12–49)
MAGNESIUM SERPL-MCNC: 2.2 MG/DL (ref 1.6–2.4)
MCH RBC QN AUTO: 31.1 PG (ref 26–34)
MCHC RBC AUTO-ENTMCNC: 34.6 G/DL (ref 30–36.5)
MCV RBC AUTO: 90.1 FL (ref 80–99)
MONOCYTES # BLD: 0.9 K/UL (ref 0–1)
MONOCYTES NFR BLD: 5 % (ref 5–13)
NEUTS SEG # BLD: 15.3 K/UL (ref 1.8–8)
NEUTS SEG NFR BLD: 89 % (ref 32–75)
NITRITE UR QL STRIP.AUTO: NEGATIVE
NRBC # BLD: 0 K/UL (ref 0–0.01)
NRBC BLD-RTO: 0 PER 100 WBC
NT PRO BNP: 301 PG/ML
PH UR STRIP: 6.5 (ref 5–8)
PLATELET # BLD AUTO: 321 K/UL (ref 150–400)
PMV BLD AUTO: 9.3 FL (ref 8.9–12.9)
POTASSIUM SERPL-SCNC: 4.4 MMOL/L (ref 3.5–5.1)
PROT SERPL-MCNC: 7.5 G/DL (ref 6.4–8.2)
PROT UR STRIP-MCNC: 30 MG/DL
PROTHROMBIN TIME: 10.6 SEC (ref 9–11.1)
RBC # BLD AUTO: 4.53 M/UL (ref 4.1–5.7)
RBC #/AREA URNS HPF: ABNORMAL /HPF (ref 0–5)
SODIUM SERPL-SCNC: 139 MMOL/L (ref 136–145)
SP GR UR REFRACTOMETRY: 1.02 (ref 1–1.03)
SPECIMEN HOLD: NORMAL
SPECIMEN HOLD: NORMAL
TROPONIN I SERPL HS-MCNC: 47 NG/L (ref 0–76)
UROBILINOGEN UR QL STRIP.AUTO: 0.2 EU/DL (ref 0.2–1)
WBC # BLD AUTO: 17.3 K/UL (ref 4.1–11.1)
WBC URNS QL MICRO: ABNORMAL /HPF (ref 0–4)

## 2024-02-21 PROCEDURE — 71045 X-RAY EXAM CHEST 1 VIEW: CPT

## 2024-02-21 PROCEDURE — 2060000000 HC ICU INTERMEDIATE R&B

## 2024-02-21 PROCEDURE — 99285 EMERGENCY DEPT VISIT HI MDM: CPT

## 2024-02-21 PROCEDURE — 83735 ASSAY OF MAGNESIUM: CPT

## 2024-02-21 PROCEDURE — 85610 PROTHROMBIN TIME: CPT

## 2024-02-21 PROCEDURE — 93005 ELECTROCARDIOGRAM TRACING: CPT | Performed by: STUDENT IN AN ORGANIZED HEALTH CARE EDUCATION/TRAINING PROGRAM

## 2024-02-21 PROCEDURE — 82550 ASSAY OF CK (CPK): CPT

## 2024-02-21 PROCEDURE — 93005 ELECTROCARDIOGRAM TRACING: CPT | Performed by: INTERNAL MEDICINE

## 2024-02-21 PROCEDURE — 83880 ASSAY OF NATRIURETIC PEPTIDE: CPT

## 2024-02-21 PROCEDURE — 80053 COMPREHEN METABOLIC PANEL: CPT

## 2024-02-21 PROCEDURE — 6370000000 HC RX 637 (ALT 250 FOR IP): Performed by: INTERNAL MEDICINE

## 2024-02-21 PROCEDURE — 96374 THER/PROPH/DIAG INJ IV PUSH: CPT

## 2024-02-21 PROCEDURE — 81001 URINALYSIS AUTO W/SCOPE: CPT

## 2024-02-21 PROCEDURE — 96361 HYDRATE IV INFUSION ADD-ON: CPT

## 2024-02-21 PROCEDURE — 93010 ELECTROCARDIOGRAM REPORT: CPT | Performed by: SPECIALIST

## 2024-02-21 PROCEDURE — 72192 CT PELVIS W/O DYE: CPT

## 2024-02-21 PROCEDURE — 6360000002 HC RX W HCPCS: Performed by: INTERNAL MEDICINE

## 2024-02-21 PROCEDURE — 6360000002 HC RX W HCPCS: Performed by: STUDENT IN AN ORGANIZED HEALTH CARE EDUCATION/TRAINING PROGRAM

## 2024-02-21 PROCEDURE — 2580000003 HC RX 258: Performed by: STUDENT IN AN ORGANIZED HEALTH CARE EDUCATION/TRAINING PROGRAM

## 2024-02-21 PROCEDURE — 2580000003 HC RX 258: Performed by: INTERNAL MEDICINE

## 2024-02-21 PROCEDURE — 85025 COMPLETE CBC W/AUTO DIFF WBC: CPT

## 2024-02-21 PROCEDURE — 36415 COLL VENOUS BLD VENIPUNCTURE: CPT

## 2024-02-21 PROCEDURE — 6370000000 HC RX 637 (ALT 250 FOR IP): Performed by: STUDENT IN AN ORGANIZED HEALTH CARE EDUCATION/TRAINING PROGRAM

## 2024-02-21 PROCEDURE — 84484 ASSAY OF TROPONIN QUANT: CPT

## 2024-02-21 PROCEDURE — 73502 X-RAY EXAM HIP UNI 2-3 VIEWS: CPT

## 2024-02-21 RX ORDER — VITAMIN B COMPLEX
2000 TABLET ORAL DAILY
Status: DISCONTINUED | OUTPATIENT
Start: 2024-02-21 | End: 2024-02-26 | Stop reason: HOSPADM

## 2024-02-21 RX ORDER — TRAMADOL HYDROCHLORIDE 50 MG/1
50 TABLET ORAL ONCE
Status: COMPLETED | OUTPATIENT
Start: 2024-02-21 | End: 2024-02-21

## 2024-02-21 RX ORDER — SODIUM CHLORIDE 9 MG/ML
INJECTION, SOLUTION INTRAVENOUS CONTINUOUS
Status: DISPENSED | OUTPATIENT
Start: 2024-02-21 | End: 2024-02-23

## 2024-02-21 RX ORDER — ASPIRIN 81 MG/1
81 TABLET, CHEWABLE ORAL DAILY
Status: DISCONTINUED | OUTPATIENT
Start: 2024-02-21 | End: 2024-02-26 | Stop reason: HOSPADM

## 2024-02-21 RX ORDER — NALOXONE HYDROCHLORIDE 0.4 MG/ML
0.4 INJECTION, SOLUTION INTRAMUSCULAR; INTRAVENOUS; SUBCUTANEOUS PRN
Status: DISCONTINUED | OUTPATIENT
Start: 2024-02-21 | End: 2024-02-26 | Stop reason: HOSPADM

## 2024-02-21 RX ORDER — SODIUM CHLORIDE 0.9 % (FLUSH) 0.9 %
5-40 SYRINGE (ML) INJECTION EVERY 12 HOURS SCHEDULED
Status: DISCONTINUED | OUTPATIENT
Start: 2024-02-21 | End: 2024-02-26 | Stop reason: HOSPADM

## 2024-02-21 RX ORDER — TRAMADOL HYDROCHLORIDE 50 MG/1
50 TABLET ORAL EVERY 6 HOURS PRN
Status: DISCONTINUED | OUTPATIENT
Start: 2024-02-21 | End: 2024-02-26 | Stop reason: HOSPADM

## 2024-02-21 RX ORDER — FENTANYL CITRATE 50 UG/ML
25 INJECTION, SOLUTION INTRAMUSCULAR; INTRAVENOUS
Status: DISCONTINUED | OUTPATIENT
Start: 2024-02-21 | End: 2024-02-22

## 2024-02-21 RX ORDER — ACETAMINOPHEN 650 MG/1
650 SUPPOSITORY RECTAL EVERY 6 HOURS PRN
Status: DISCONTINUED | OUTPATIENT
Start: 2024-02-21 | End: 2024-02-26 | Stop reason: HOSPADM

## 2024-02-21 RX ORDER — FENTANYL CITRATE 50 UG/ML
25 INJECTION, SOLUTION INTRAMUSCULAR; INTRAVENOUS
Status: DISCONTINUED | OUTPATIENT
Start: 2024-02-21 | End: 2024-02-21

## 2024-02-21 RX ORDER — ACETAMINOPHEN 325 MG/1
650 TABLET ORAL EVERY 6 HOURS PRN
Status: DISCONTINUED | OUTPATIENT
Start: 2024-02-21 | End: 2024-02-26 | Stop reason: HOSPADM

## 2024-02-21 RX ORDER — SENNA AND DOCUSATE SODIUM 50; 8.6 MG/1; MG/1
1 TABLET, FILM COATED ORAL 2 TIMES DAILY
Status: DISCONTINUED | OUTPATIENT
Start: 2024-02-21 | End: 2024-02-22 | Stop reason: SDUPTHER

## 2024-02-21 RX ORDER — 0.9 % SODIUM CHLORIDE 0.9 %
1000 INTRAVENOUS SOLUTION INTRAVENOUS ONCE
Status: COMPLETED | OUTPATIENT
Start: 2024-02-21 | End: 2024-02-21

## 2024-02-21 RX ORDER — SODIUM CHLORIDE 9 MG/ML
INJECTION, SOLUTION INTRAVENOUS PRN
Status: DISCONTINUED | OUTPATIENT
Start: 2024-02-21 | End: 2024-02-26 | Stop reason: HOSPADM

## 2024-02-21 RX ORDER — ENOXAPARIN SODIUM 100 MG/ML
40 INJECTION SUBCUTANEOUS DAILY
Status: DISCONTINUED | OUTPATIENT
Start: 2024-02-21 | End: 2024-02-26 | Stop reason: HOSPADM

## 2024-02-21 RX ORDER — SODIUM CHLORIDE 0.9 % (FLUSH) 0.9 %
5-40 SYRINGE (ML) INJECTION PRN
Status: DISCONTINUED | OUTPATIENT
Start: 2024-02-21 | End: 2024-02-26 | Stop reason: HOSPADM

## 2024-02-21 RX ORDER — ONDANSETRON 2 MG/ML
4 INJECTION INTRAMUSCULAR; INTRAVENOUS EVERY 6 HOURS PRN
Status: DISCONTINUED | OUTPATIENT
Start: 2024-02-21 | End: 2024-02-26 | Stop reason: HOSPADM

## 2024-02-21 RX ORDER — ONDANSETRON 4 MG/1
4 TABLET, ORALLY DISINTEGRATING ORAL EVERY 8 HOURS PRN
Status: DISCONTINUED | OUTPATIENT
Start: 2024-02-21 | End: 2024-02-26 | Stop reason: HOSPADM

## 2024-02-21 RX ADMIN — TRAMADOL HYDROCHLORIDE 50 MG: 50 TABLET ORAL at 13:12

## 2024-02-21 RX ADMIN — FENTANYL CITRATE 25 MCG: 0.05 INJECTION, SOLUTION INTRAMUSCULAR; INTRAVENOUS at 11:57

## 2024-02-21 RX ADMIN — Medication 2000 UNITS: at 16:21

## 2024-02-21 RX ADMIN — Medication 100 UNITS: at 16:21

## 2024-02-21 RX ADMIN — SODIUM CHLORIDE: 9 INJECTION, SOLUTION INTRAVENOUS at 16:20

## 2024-02-21 RX ADMIN — ENOXAPARIN SODIUM 40 MG: 100 INJECTION SUBCUTANEOUS at 16:21

## 2024-02-21 RX ADMIN — SODIUM CHLORIDE 1000 ML: 9 INJECTION, SOLUTION INTRAVENOUS at 12:39

## 2024-02-21 RX ADMIN — ASPIRIN 81 MG CHEWABLE TABLET 81 MG: 81 TABLET CHEWABLE at 16:21

## 2024-02-21 RX ADMIN — DOCUSATE SODIUM 50MG AND SENNOSIDES 8.6MG 1 TABLET: 8.6; 5 TABLET, FILM COATED ORAL at 16:22

## 2024-02-21 ASSESSMENT — PAIN - FUNCTIONAL ASSESSMENT
PAIN_FUNCTIONAL_ASSESSMENT: PREVENTS OR INTERFERES SOME ACTIVE ACTIVITIES AND ADLS
PAIN_FUNCTIONAL_ASSESSMENT: 0-10

## 2024-02-21 ASSESSMENT — PAIN DESCRIPTION - LOCATION
LOCATION: HIP

## 2024-02-21 ASSESSMENT — PAIN SCALES - GENERAL
PAINLEVEL_OUTOF10: 10
PAINLEVEL_OUTOF10: 10
PAINLEVEL_OUTOF10: 3
PAINLEVEL_OUTOF10: 10
PAINLEVEL_OUTOF10: 5
PAINLEVEL_OUTOF10: 0
PAINLEVEL_OUTOF10: 0

## 2024-02-21 ASSESSMENT — PAIN DESCRIPTION - PAIN TYPE
TYPE: ACUTE PAIN

## 2024-02-21 ASSESSMENT — PAIN DESCRIPTION - DESCRIPTORS
DESCRIPTORS: DISCOMFORT
DESCRIPTORS: ACHING;DISCOMFORT
DESCRIPTORS: ACHING;DISCOMFORT

## 2024-02-21 ASSESSMENT — PAIN DESCRIPTION - ONSET
ONSET: SUDDEN
ONSET: ON-GOING
ONSET: ON-GOING

## 2024-02-21 ASSESSMENT — PAIN DESCRIPTION - FREQUENCY
FREQUENCY: CONTINUOUS

## 2024-02-21 ASSESSMENT — PAIN DESCRIPTION - ORIENTATION
ORIENTATION: RIGHT
ORIENTATION: RIGHT;LEFT
ORIENTATION: RIGHT;LEFT

## 2024-02-21 NOTE — ED PROVIDER NOTES
[facility] EMERGENCY DEP  EMERGENCY DEPARTMENT ENCOUNTER      Pt Name: William Gould  MRN: [id number]  Birthdate 1935  Date of evaluation: 2/21/2024  Provider: Rigoberto Quinones DO    CHIEF COMPLAINT       Chief Complaint   Patient presents with    Fall    Hip Pain         HISTORY OF PRESENT ILLNESS   (Location/Symptom, Timing/Onset, Context/Setting, Quality, Duration, Modifying Factors, Severity)  Note limiting factors.   Patient is an 88-year-old male prior history of hypertension, lumbar stenosis with neurogenic claudication, arthritis presenting today secondary to a fall.  Patient fell overnight between 1 and 2 AM when try to get up to use the bathroom.  He states that his legs gave out on him causing him to fall.  He complains of severe pain to the right hip described as a spasm.  She was too weak to get off the floor and laid on the ground all night.  Patient was found covered in stool and urine.  Denies head injury or neck pain.  Denies chest pain or shortness of breath.  Denies focal weakness or numbness at this time--just generalized weakness.            Review of External Medical Records:     Nursing Notes were reviewed.    REVIEW OF SYSTEMS    (2-9 systems for level 4, 10 or more for level 5)     Review of Systems   Constitutional:  Positive for fatigue.   Musculoskeletal:  Positive for arthralgias.       Except as noted above the remainder of the review of systems was reviewed and negative.       PAST MEDICAL HISTORY     Past Medical History:   Diagnosis Date    Arthritis     Bleeds easily (HCC)     Chronic pain     Essential hypertension     Lumbar stenosis with neurogenic claudication 2/8/2018    Skin cancer     BCCA; PROSTATE         SURGICAL HISTORY       Past Surgical History:   Procedure Laterality Date    [facility]    ORTHOPEDIC SURGERY      back surgery     PROSTATECTOMY  2002         CURRENT MEDICATIONS       Previous Medications    ASPIRIN 81 MG

## 2024-02-21 NOTE — CONSULTS
UA 0-5 0 - 5 /hpf    Epithelial Cells UA FEW FEW /lpf    BACTERIA, URINE Negative NEG /hpf    Hyaline Casts, UA 0-2 0 - 5 /lpf   Urine Culture Hold Sample    Collection Time: 02/21/24 12:02 PM    Specimen: Urine   Result Value Ref Range    Specimen HOld        Urine on hold in Microbiology dept for 2 days.  If unpreserved urine is submitted, it cannot be used for addtional testing after 24 hours, recollection will be required.       Data Review:  ECG tracing personally reviewed:   Encounter Date: 02/21/24   EKG 12 Lead   Result Value    Ventricular Rate 86    QRS Duration 76    Q-T Interval 376    QTc Calculation (Bazett) 449    R Axis -7    T Axis 60    Diagnosis      ** Poor data quality, interpretation may be adversely affected Atrial   fibrillation with premature ventricular or aberrantly conducted complexes  Cannot rule out Anterior infarct (cited on or before 21-FEB-2024)  Abnormal ECG  When compared with ECG of 26-APR-2022 14:49,  Atrial fibrillation has replaced Sinus rhythm  Vent. rate has increased BY  30 BPM  Non-specific change in ST segment in Lateral leads  Confirmed by CODY Jimenez, [facility] ([id number]) on 2/21/2024 2:27:46 PM         Echocardiogram:  No results found for this or any previous visit.      Other cardiac testing:   TTE pending    Other imaging:   CT pelvis:  IMPRESSION:  No evidence of acute process.    CXR:  IMPRESSION:     No acute findings.    Satish Mills MD  Structural Interventional Cardiology  [facility]  02/21/24

## 2024-02-21 NOTE — H&P
History and Physical    Date of Service:  2/21/2024  Primary Care Provider: Lisa Ladd MD  Source of information: The patient and Chart review    Chief Complaint: Fall and Hip Pain      History of Presenting Illness:   William Gould is a 88 y.o. male with HTN, lumbar stenosis with neurogenic claudication, decreased hearing, arthritis, chronic pain, h/o frequent falls, and h/o prostate CA s/p prostatectomy who was BIBEMS from home s/p ground-level fall around 0200. Pt was sitting on the edge of the bed about to go to the bathroom but fell off the edge of the bed onto the floor, hitting his tailbone; he denies head trauma/LOC. Pt was on the floor for at least 4-5 hours. His wife sleeps in a separate room, found him this morning, and called 911. Pt is currently only c/o bilateral groin pain. Pt reports he started taking fexofenadine for allergies 1 month ago and takes ASA 81mg daily. Pt reports frequent falls; he previously fell on 2/14 and sustained a L shoulder dislocation ([facility] at [facility]). Pt denies any CP, SOB, palpitations, f/c/n/v, abdominal pain, diarrhea/constipation, dysuria, back pain. In the ED, he was found to have rhabdo with CK 1748 and was given IVNS bolus. He was also found to have new onset atrial fibrillation with HR 80s-90s and denies a previous h/o arrhythmias.      REVIEW OF SYSTEMS:  Pertinent items are noted in the History of Present Illness.     Past Medical History:   Diagnosis Date    Arthritis     Bleeds easily (HCC)     Chronic pain     Essential hypertension     Lumbar stenosis with neurogenic claudication 2/8/2018    Skin cancer     BCCA; PROSTATE      Past Surgical History:   Procedure Laterality Date    [facility]    ORTHOPEDIC SURGERY      back surgery     PROSTATECTOMY  2002     Prior to Admission medications    Medication Sig Start Date End Date Taking? Authorizing Provider   aspirin 81 MG chewable tablet Take 81 mg by mouth daily

## 2024-02-21 NOTE — ED NOTES
TRANSFER - OUT REPORT:    Verbal report given to INDIRA Looney on William Gould  being transferred to [address] [place] [address] for routine progression of patient care       Report consisted of patient's Situation, Background, Assessment and   Recommendations(SBAR).     Information from the following report(s) Nurse Handoff Report, Intake/Output, MAR, Recent Results, and Cardiac Rhythm a-fib  was reviewed with the receiving nurse.    [place] Fall Assessment:    Presents to emergency department  because of falls (Syncope, seizure, or loss of consciousness): Yes  Age > 70: Yes  Altered Mental Status, Intoxication with alcohol or substance confusion (Disorientation, impaired judgment, poor safety awaremess, or inability to follow instructions): No  Impaired Mobility: Ambulates or transfers with assistive devices or assistance; Unable to ambulate or transer.: Yes  Nursing Judgement: Yes          Lines:   Peripheral IV 02/21/24 Right Antecubital (Active)   Site Assessment Clean, dry & intact 02/21/24 0943   Line Status Specimen collected 02/21/24 0943   Phlebitis Assessment No symptoms 02/21/24 0943   Infiltration Assessment 0 02/21/24 0943   Alcohol Cap Used No 02/21/24 0943   Dressing Status New dressing applied 02/21/24 0943   Dressing Type Transparent 02/21/24 0943   Dressing Intervention New 02/21/24 0943        Opportunity for questions and clarification was provided.      Patient transported with:  transport

## 2024-02-21 NOTE — ED TRIAGE NOTES
Pt comes to ED via EMS from home with reports of a GLF around 0200 with an attempt to get to the restroom. Pt denies change in LOC or hitting his head. No blood thinners. Reports right hip pain.     Pt presented soiled with urine and feces. Pt cleaned up with skin care provided. New linens and placed in a gown.     Placed on cardiac monitor x 3.

## 2024-02-22 ENCOUNTER — APPOINTMENT (OUTPATIENT)
Facility: HOSPITAL | Age: 89
DRG: 566 | End: 2024-02-22
Attending: INTERNAL MEDICINE
Payer: MEDICARE

## 2024-02-22 LAB
ALBUMIN SERPL-MCNC: 3.1 G/DL (ref 3.5–5)
ALBUMIN/GLOB SERPL: 1 (ref 1.1–2.2)
ALP SERPL-CCNC: 80 U/L (ref 45–117)
ALT SERPL-CCNC: 43 U/L (ref 12–78)
ANION GAP SERPL CALC-SCNC: 9 MMOL/L (ref 5–15)
AST SERPL-CCNC: 146 U/L (ref 15–37)
BASOPHILS # BLD: 0 K/UL (ref 0–0.1)
BASOPHILS NFR BLD: 0 % (ref 0–1)
BILIRUB SERPL-MCNC: 0.8 MG/DL (ref 0.2–1)
BUN SERPL-MCNC: 14 MG/DL (ref 6–20)
BUN/CREAT SERPL: 14 (ref 12–20)
CALCIUM SERPL-MCNC: 8.7 MG/DL (ref 8.5–10.1)
CHLORIDE SERPL-SCNC: 112 MMOL/L (ref 97–108)
CK SERPL-CCNC: 7097 U/L (ref 39–308)
CO2 SERPL-SCNC: 21 MMOL/L (ref 21–32)
CREAT SERPL-MCNC: 1.01 MG/DL (ref 0.7–1.3)
DIFFERENTIAL METHOD BLD: ABNORMAL
EOSINOPHIL # BLD: 0.4 K/UL (ref 0–0.4)
EOSINOPHIL NFR BLD: 4 % (ref 0–7)
ERYTHROCYTE [DISTWIDTH] IN BLOOD BY AUTOMATED COUNT: 13.5 % (ref 11.5–14.5)
GLOBULIN SER CALC-MCNC: 3.1 G/DL (ref 2–4)
GLUCOSE SERPL-MCNC: 97 MG/DL (ref 65–100)
HCT VFR BLD AUTO: 36.3 % (ref 36.6–50.3)
HGB BLD-MCNC: 12.6 G/DL (ref 12.1–17)
IMM GRANULOCYTES # BLD AUTO: 0 K/UL (ref 0–0.04)
IMM GRANULOCYTES NFR BLD AUTO: 0 % (ref 0–0.5)
LYMPHOCYTES # BLD: 1.5 K/UL (ref 0.8–3.5)
LYMPHOCYTES NFR BLD: 16 % (ref 12–49)
MAGNESIUM SERPL-MCNC: 2.1 MG/DL (ref 1.6–2.4)
MCH RBC QN AUTO: 31.1 PG (ref 26–34)
MCHC RBC AUTO-ENTMCNC: 34.7 G/DL (ref 30–36.5)
MCV RBC AUTO: 89.6 FL (ref 80–99)
MONOCYTES # BLD: 0.8 K/UL (ref 0–1)
MONOCYTES NFR BLD: 8 % (ref 5–13)
NEUTS SEG # BLD: 6.5 K/UL (ref 1.8–8)
NEUTS SEG NFR BLD: 72 % (ref 32–75)
NRBC # BLD: 0 K/UL (ref 0–0.01)
NRBC BLD-RTO: 0 PER 100 WBC
PLATELET # BLD AUTO: 262 K/UL (ref 150–400)
PMV BLD AUTO: 9.1 FL (ref 8.9–12.9)
POTASSIUM SERPL-SCNC: 4 MMOL/L (ref 3.5–5.1)
PROT SERPL-MCNC: 6.2 G/DL (ref 6.4–8.2)
RBC # BLD AUTO: 4.05 M/UL (ref 4.1–5.7)
SODIUM SERPL-SCNC: 142 MMOL/L (ref 136–145)
WBC # BLD AUTO: 9.3 K/UL (ref 4.1–11.1)

## 2024-02-22 PROCEDURE — 83735 ASSAY OF MAGNESIUM: CPT

## 2024-02-22 PROCEDURE — 36415 COLL VENOUS BLD VENIPUNCTURE: CPT

## 2024-02-22 PROCEDURE — 2060000000 HC ICU INTERMEDIATE R&B

## 2024-02-22 PROCEDURE — 2580000003 HC RX 258: Performed by: INTERNAL MEDICINE

## 2024-02-22 PROCEDURE — 6370000000 HC RX 637 (ALT 250 FOR IP): Performed by: INTERNAL MEDICINE

## 2024-02-22 PROCEDURE — 6360000002 HC RX W HCPCS: Performed by: INTERNAL MEDICINE

## 2024-02-22 PROCEDURE — 82550 ASSAY OF CK (CPK): CPT

## 2024-02-22 PROCEDURE — 97530 THERAPEUTIC ACTIVITIES: CPT

## 2024-02-22 PROCEDURE — 80053 COMPREHEN METABOLIC PANEL: CPT

## 2024-02-22 PROCEDURE — 97161 PT EVAL LOW COMPLEX 20 MIN: CPT

## 2024-02-22 PROCEDURE — 85025 COMPLETE CBC W/AUTO DIFF WBC: CPT

## 2024-02-22 PROCEDURE — 97535 SELF CARE MNGMENT TRAINING: CPT

## 2024-02-22 PROCEDURE — 97165 OT EVAL LOW COMPLEX 30 MIN: CPT

## 2024-02-22 RX ORDER — SENNA AND DOCUSATE SODIUM 50; 8.6 MG/1; MG/1
1 TABLET, FILM COATED ORAL 2 TIMES DAILY
Status: DISCONTINUED | OUTPATIENT
Start: 2024-02-22 | End: 2024-02-26 | Stop reason: HOSPADM

## 2024-02-22 RX ORDER — VALSARTAN AND HYDROCHLOROTHIAZIDE 80; 12.5 MG/1; MG/1
1 TABLET, FILM COATED ORAL DAILY
Status: DISCONTINUED | OUTPATIENT
Start: 2024-02-22 | End: 2024-02-22

## 2024-02-22 RX ORDER — CYCLOBENZAPRINE HCL 10 MG
10 TABLET ORAL 3 TIMES DAILY PRN
Status: DISCONTINUED | OUTPATIENT
Start: 2024-02-22 | End: 2024-02-26 | Stop reason: HOSPADM

## 2024-02-22 RX ORDER — HYDROCHLOROTHIAZIDE 25 MG/1
12.5 TABLET ORAL DAILY
Status: DISCONTINUED | OUTPATIENT
Start: 2024-02-22 | End: 2024-02-26 | Stop reason: HOSPADM

## 2024-02-22 RX ORDER — VALSARTAN 160 MG/1
80 TABLET ORAL DAILY
Status: DISCONTINUED | OUTPATIENT
Start: 2024-02-22 | End: 2024-02-26 | Stop reason: HOSPADM

## 2024-02-22 RX ORDER — HYDROMORPHONE HYDROCHLORIDE 2 MG/1
2 TABLET ORAL EVERY 4 HOURS PRN
Status: DISCONTINUED | OUTPATIENT
Start: 2024-02-22 | End: 2024-02-22

## 2024-02-22 RX ADMIN — SODIUM CHLORIDE, PRESERVATIVE FREE 10 ML: 5 INJECTION INTRAVENOUS at 09:33

## 2024-02-22 RX ADMIN — VALSARTAN 80 MG: 160 TABLET, FILM COATED ORAL at 09:29

## 2024-02-22 RX ADMIN — Medication 2000 UNITS: at 09:31

## 2024-02-22 RX ADMIN — HYDROCHLOROTHIAZIDE 12.5 MG: 25 TABLET ORAL at 09:27

## 2024-02-22 RX ADMIN — ASPIRIN 81 MG CHEWABLE TABLET 81 MG: 81 TABLET CHEWABLE at 09:32

## 2024-02-22 RX ADMIN — Medication 100 UNITS: at 09:32

## 2024-02-22 RX ADMIN — ENOXAPARIN SODIUM 40 MG: 100 INJECTION SUBCUTANEOUS at 09:32

## 2024-02-22 RX ADMIN — TRAMADOL HYDROCHLORIDE 50 MG: 50 TABLET ORAL at 07:24

## 2024-02-22 ASSESSMENT — PAIN SCALES - GENERAL
PAINLEVEL_OUTOF10: 0
PAINLEVEL_OUTOF10: 7
PAINLEVEL_OUTOF10: 0

## 2024-02-22 ASSESSMENT — PAIN DESCRIPTION - LOCATION: LOCATION: HIP

## 2024-02-22 ASSESSMENT — PAIN DESCRIPTION - ORIENTATION: ORIENTATION: RIGHT

## 2024-02-22 NOTE — CARE COORDINATION
Care Management Initial Assessment       RUR:11%  Readmission? No  1st IM letter given? Yes - 2/22/24  1st  letter given: No    Wife prefers only [facility] if SNF Rehab recommended and  At Home Care for [facility]    Contact: Sara Gould -[phone]    Awaiting PT/OT Braulio    CM met with patient and his wife to discuss discharge planning. They live together in their private residence with no steps to get in. The patient needs assistance with his ADLs and IADLs, he uses a walker and a rollator but also has a cane.   Patient had several incidents of falls including a recent one prior to his admission. The wife is the only caregiver but has a son in the area.  She verified his demographic information and did not voice any discharge barriers.   NICOLE Chang MSA, RN, CM       02/22/24 1232   Service Assessment   Patient Orientation Alert and Oriented   Cognition Alert   History Provided By Patient;Significant Other   Primary Caregiver Spouse   Support Systems Spouse/Significant Other   PCP Verified by CM Yes   Prior Functional Level Assistance with the following:;Bathing;Dressing;Cooking;Housework;Shopping;Mobility   Current Functional Level Bathing;Dressing;Mobility;Shopping;Housework;Cooking   Can patient return to prior living arrangement Yes   Ability to make needs known: Good   Family able to assist with home care needs: Yes   Would you like for me to discuss the discharge plan with any other family members/significant others, and if so, who? Yes   Financial Resources Medicare   Community Resources None   Social/Functional History   Lives With Spouse   Type of Home House   Home Layout One level   Home Access Level entry   Bathroom Shower/Tub Shower chair without back   Bathroom Equipment Grab bars around toilet   Home Equipment Rollator;Walker, rolling   Receives Help From Family   Active  No   Mode of Transportation SUV   Occupation Retired   Discharge Planning   Living Arrangements

## 2024-02-23 ENCOUNTER — APPOINTMENT (OUTPATIENT)
Facility: HOSPITAL | Age: 89
DRG: 566 | End: 2024-02-23
Attending: INTERNAL MEDICINE
Payer: MEDICARE

## 2024-02-23 LAB
ALBUMIN SERPL-MCNC: 3 G/DL (ref 3.5–5)
ALBUMIN/GLOB SERPL: 1 (ref 1.1–2.2)
ALP SERPL-CCNC: 73 U/L (ref 45–117)
ALT SERPL-CCNC: 51 U/L (ref 12–78)
ANION GAP SERPL CALC-SCNC: 8 MMOL/L (ref 5–15)
AST SERPL-CCNC: 135 U/L (ref 15–37)
BASOPHILS # BLD: 0.1 K/UL (ref 0–0.1)
BASOPHILS NFR BLD: 1 % (ref 0–1)
BILIRUB SERPL-MCNC: 0.6 MG/DL (ref 0.2–1)
BUN SERPL-MCNC: 14 MG/DL (ref 6–20)
BUN/CREAT SERPL: 15 (ref 12–20)
CALCIUM SERPL-MCNC: 8.6 MG/DL (ref 8.5–10.1)
CHLORIDE SERPL-SCNC: 110 MMOL/L (ref 97–108)
CK SERPL-CCNC: 5203 U/L (ref 39–308)
CO2 SERPL-SCNC: 21 MMOL/L (ref 21–32)
CREAT SERPL-MCNC: 0.96 MG/DL (ref 0.7–1.3)
DIFFERENTIAL METHOD BLD: ABNORMAL
ECHO AO ROOT DIAM: 3.4 CM
ECHO AO ROOT INDEX: 1.62 CM/M2
ECHO AV AREA PEAK VELOCITY: 2 CM2
ECHO AV AREA/BSA PEAK VELOCITY: 1 CM2/M2
ECHO AV PEAK GRADIENT: 12 MMHG
ECHO AV PEAK VELOCITY: 1.7 M/S
ECHO AV VELOCITY RATIO: 0.53
ECHO BSA: 2.13 M2
ECHO EST RA PRESSURE: 3 MMHG
ECHO LV E' LATERAL VELOCITY: 12 CM/S
ECHO LV E' SEPTAL VELOCITY: 19 CM/S
ECHO LV EDV A2C: 66 ML
ECHO LV EDV A4C: 117 ML
ECHO LV EDV BP: 90 ML (ref 67–155)
ECHO LV EDV INDEX A4C: 56 ML/M2
ECHO LV EDV INDEX BP: 43 ML/M2
ECHO LV EDV NDEX A2C: 31 ML/M2
ECHO LV EJECTION FRACTION A2C: 75 %
ECHO LV EJECTION FRACTION A4C: 51 %
ECHO LV EJECTION FRACTION BIPLANE: 62 % (ref 55–100)
ECHO LV ESV A2C: 16 ML
ECHO LV ESV A4C: 58 ML
ECHO LV ESV BP: 35 ML (ref 22–58)
ECHO LV ESV INDEX A2C: 8 ML/M2
ECHO LV ESV INDEX A4C: 28 ML/M2
ECHO LV ESV INDEX BP: 17 ML/M2
ECHO LVOT AREA: 3.8 CM2
ECHO LVOT DIAM: 2.2 CM
ECHO LVOT PEAK GRADIENT: 3 MMHG
ECHO LVOT PEAK VELOCITY: 0.9 M/S
ECHO MV REGURGITANT PEAK GRADIENT: 27 MMHG
ECHO MV REGURGITANT PEAK VELOCITY: 2.6 M/S
ECHO PV MAX VELOCITY: 1.2 M/S
ECHO PV PEAK GRADIENT: 6 MMHG
ECHO RIGHT VENTRICULAR SYSTOLIC PRESSURE (RVSP): 29 MMHG
ECHO RV FREE WALL PEAK S': 20 CM/S
ECHO RV TAPSE: 1.9 CM (ref 1.7–?)
ECHO TV REGURGITANT MAX VELOCITY: 2.56 M/S
ECHO TV REGURGITANT PEAK GRADIENT: 26 MMHG
EKG ATRIAL RATE: 92 BPM
EKG DIAGNOSIS: NORMAL
EKG P AXIS: 22 DEGREES
EKG P-R INTERVAL: 138 MS
EKG Q-T INTERVAL: 354 MS
EKG QRS DURATION: 78 MS
EKG QTC CALCULATION (BAZETT): 437 MS
EKG R AXIS: -9 DEGREES
EKG T AXIS: 43 DEGREES
EKG VENTRICULAR RATE: 92 BPM
EOSINOPHIL # BLD: 0.3 K/UL (ref 0–0.4)
EOSINOPHIL NFR BLD: 4 % (ref 0–7)
ERYTHROCYTE [DISTWIDTH] IN BLOOD BY AUTOMATED COUNT: 13.3 % (ref 11.5–14.5)
GLOBULIN SER CALC-MCNC: 2.9 G/DL (ref 2–4)
GLUCOSE SERPL-MCNC: 89 MG/DL (ref 65–100)
HCT VFR BLD AUTO: 36.4 % (ref 36.6–50.3)
HGB BLD-MCNC: 12.5 G/DL (ref 12.1–17)
IMM GRANULOCYTES # BLD AUTO: 0 K/UL (ref 0–0.04)
IMM GRANULOCYTES NFR BLD AUTO: 0 % (ref 0–0.5)
LYMPHOCYTES # BLD: 2.1 K/UL (ref 0.8–3.5)
LYMPHOCYTES NFR BLD: 28 % (ref 12–49)
MAGNESIUM SERPL-MCNC: 2 MG/DL (ref 1.6–2.4)
MCH RBC QN AUTO: 30.8 PG (ref 26–34)
MCHC RBC AUTO-ENTMCNC: 34.3 G/DL (ref 30–36.5)
MCV RBC AUTO: 89.7 FL (ref 80–99)
MONOCYTES # BLD: 0.8 K/UL (ref 0–1)
MONOCYTES NFR BLD: 11 % (ref 5–13)
NEUTS SEG # BLD: 4.4 K/UL (ref 1.8–8)
NEUTS SEG NFR BLD: 56 % (ref 32–75)
NRBC # BLD: 0 K/UL (ref 0–0.01)
NRBC BLD-RTO: 0 PER 100 WBC
PLATELET # BLD AUTO: 259 K/UL (ref 150–400)
PMV BLD AUTO: 9.3 FL (ref 8.9–12.9)
POTASSIUM SERPL-SCNC: 3.7 MMOL/L (ref 3.5–5.1)
PROT SERPL-MCNC: 5.9 G/DL (ref 6.4–8.2)
RBC # BLD AUTO: 4.06 M/UL (ref 4.1–5.7)
SODIUM SERPL-SCNC: 139 MMOL/L (ref 136–145)
WBC # BLD AUTO: 7.7 K/UL (ref 4.1–11.1)

## 2024-02-23 PROCEDURE — 36415 COLL VENOUS BLD VENIPUNCTURE: CPT

## 2024-02-23 PROCEDURE — 80053 COMPREHEN METABOLIC PANEL: CPT

## 2024-02-23 PROCEDURE — 85025 COMPLETE CBC W/AUTO DIFF WBC: CPT

## 2024-02-23 PROCEDURE — 6370000000 HC RX 637 (ALT 250 FOR IP): Performed by: INTERNAL MEDICINE

## 2024-02-23 PROCEDURE — 97116 GAIT TRAINING THERAPY: CPT | Performed by: PHYSICAL THERAPIST

## 2024-02-23 PROCEDURE — 2060000000 HC ICU INTERMEDIATE R&B

## 2024-02-23 PROCEDURE — 83735 ASSAY OF MAGNESIUM: CPT

## 2024-02-23 PROCEDURE — 93306 TTE W/DOPPLER COMPLETE: CPT

## 2024-02-23 PROCEDURE — 82550 ASSAY OF CK (CPK): CPT

## 2024-02-23 PROCEDURE — 93010 ELECTROCARDIOGRAM REPORT: CPT | Performed by: SPECIALIST

## 2024-02-23 PROCEDURE — 6360000002 HC RX W HCPCS: Performed by: INTERNAL MEDICINE

## 2024-02-23 PROCEDURE — 2580000003 HC RX 258: Performed by: INTERNAL MEDICINE

## 2024-02-23 PROCEDURE — 97530 THERAPEUTIC ACTIVITIES: CPT | Performed by: PHYSICAL THERAPIST

## 2024-02-23 RX ADMIN — SENNOSIDES AND DOCUSATE SODIUM 1 TABLET: 8.6; 5 TABLET ORAL at 09:52

## 2024-02-23 RX ADMIN — Medication 2000 UNITS: at 10:01

## 2024-02-23 RX ADMIN — SODIUM CHLORIDE, PRESERVATIVE FREE 10 ML: 5 INJECTION INTRAVENOUS at 21:19

## 2024-02-23 RX ADMIN — Medication 100 UNITS: at 09:52

## 2024-02-23 RX ADMIN — SODIUM CHLORIDE, PRESERVATIVE FREE 10 ML: 5 INJECTION INTRAVENOUS at 09:53

## 2024-02-23 RX ADMIN — ASPIRIN 81 MG CHEWABLE TABLET 81 MG: 81 TABLET CHEWABLE at 09:52

## 2024-02-23 RX ADMIN — ENOXAPARIN SODIUM 40 MG: 100 INJECTION SUBCUTANEOUS at 09:52

## 2024-02-23 ASSESSMENT — PAIN DESCRIPTION - ORIENTATION: ORIENTATION: RIGHT

## 2024-02-23 ASSESSMENT — PAIN SCALES - GENERAL
PAINLEVEL_OUTOF10: 0
PAINLEVEL_OUTOF10: 2

## 2024-02-23 ASSESSMENT — PAIN DESCRIPTION - LOCATION: LOCATION: LEG

## 2024-02-23 NOTE — CARE COORDINATION
Brief Note:  Referral sent via [facility] to [facility].    ALEXYS Reynoso, CRM  [phone]

## 2024-02-24 LAB
ALBUMIN SERPL-MCNC: 2.7 G/DL (ref 3.5–5)
ALBUMIN/GLOB SERPL: 0.9 (ref 1.1–2.2)
ALP SERPL-CCNC: 63 U/L (ref 45–117)
ALT SERPL-CCNC: 45 U/L (ref 12–78)
ANION GAP SERPL CALC-SCNC: 6 MMOL/L (ref 5–15)
AST SERPL-CCNC: 82 U/L (ref 15–37)
BASOPHILS # BLD: 0.1 K/UL (ref 0–0.1)
BASOPHILS NFR BLD: 1 % (ref 0–1)
BILIRUB SERPL-MCNC: 0.5 MG/DL (ref 0.2–1)
BUN SERPL-MCNC: 11 MG/DL (ref 6–20)
BUN/CREAT SERPL: 11 (ref 12–20)
CALCIUM SERPL-MCNC: 8.7 MG/DL (ref 8.5–10.1)
CHLORIDE SERPL-SCNC: 112 MMOL/L (ref 97–108)
CK SERPL-CCNC: 2163 U/L (ref 39–308)
CO2 SERPL-SCNC: 24 MMOL/L (ref 21–32)
CREAT SERPL-MCNC: 0.96 MG/DL (ref 0.7–1.3)
DIFFERENTIAL METHOD BLD: ABNORMAL
EOSINOPHIL # BLD: 0.7 K/UL (ref 0–0.4)
EOSINOPHIL NFR BLD: 11 % (ref 0–7)
ERYTHROCYTE [DISTWIDTH] IN BLOOD BY AUTOMATED COUNT: 13.2 % (ref 11.5–14.5)
GLOBULIN SER CALC-MCNC: 3.1 G/DL (ref 2–4)
GLUCOSE SERPL-MCNC: 96 MG/DL (ref 65–100)
HCT VFR BLD AUTO: 33.7 % (ref 36.6–50.3)
HGB BLD-MCNC: 11.9 G/DL (ref 12.1–17)
IMM GRANULOCYTES # BLD AUTO: 0 K/UL (ref 0–0.04)
IMM GRANULOCYTES NFR BLD AUTO: 0 % (ref 0–0.5)
LYMPHOCYTES # BLD: 2.1 K/UL (ref 0.8–3.5)
LYMPHOCYTES NFR BLD: 33 % (ref 12–49)
MAGNESIUM SERPL-MCNC: 2 MG/DL (ref 1.6–2.4)
MCH RBC QN AUTO: 31.3 PG (ref 26–34)
MCHC RBC AUTO-ENTMCNC: 35.3 G/DL (ref 30–36.5)
MCV RBC AUTO: 88.7 FL (ref 80–99)
MONOCYTES # BLD: 0.7 K/UL (ref 0–1)
MONOCYTES NFR BLD: 10 % (ref 5–13)
NEUTS SEG # BLD: 2.9 K/UL (ref 1.8–8)
NEUTS SEG NFR BLD: 45 % (ref 32–75)
NRBC # BLD: 0 K/UL (ref 0–0.01)
NRBC BLD-RTO: 0 PER 100 WBC
PLATELET # BLD AUTO: 223 K/UL (ref 150–400)
PMV BLD AUTO: 9.1 FL (ref 8.9–12.9)
POTASSIUM SERPL-SCNC: 3.4 MMOL/L (ref 3.5–5.1)
PROT SERPL-MCNC: 5.8 G/DL (ref 6.4–8.2)
RBC # BLD AUTO: 3.8 M/UL (ref 4.1–5.7)
SODIUM SERPL-SCNC: 142 MMOL/L (ref 136–145)
WBC # BLD AUTO: 6.5 K/UL (ref 4.1–11.1)

## 2024-02-24 PROCEDURE — 2580000003 HC RX 258: Performed by: INTERNAL MEDICINE

## 2024-02-24 PROCEDURE — 94761 N-INVAS EAR/PLS OXIMETRY MLT: CPT

## 2024-02-24 PROCEDURE — 36415 COLL VENOUS BLD VENIPUNCTURE: CPT

## 2024-02-24 PROCEDURE — 2060000000 HC ICU INTERMEDIATE R&B

## 2024-02-24 PROCEDURE — 6370000000 HC RX 637 (ALT 250 FOR IP): Performed by: INTERNAL MEDICINE

## 2024-02-24 PROCEDURE — 6360000002 HC RX W HCPCS: Performed by: INTERNAL MEDICINE

## 2024-02-24 PROCEDURE — 82550 ASSAY OF CK (CPK): CPT

## 2024-02-24 PROCEDURE — 85025 COMPLETE CBC W/AUTO DIFF WBC: CPT

## 2024-02-24 PROCEDURE — 83735 ASSAY OF MAGNESIUM: CPT

## 2024-02-24 PROCEDURE — 80053 COMPREHEN METABOLIC PANEL: CPT

## 2024-02-24 RX ADMIN — SODIUM CHLORIDE, PRESERVATIVE FREE 10 ML: 5 INJECTION INTRAVENOUS at 21:00

## 2024-02-24 RX ADMIN — Medication 2000 UNITS: at 11:14

## 2024-02-24 RX ADMIN — ENOXAPARIN SODIUM 40 MG: 100 INJECTION SUBCUTANEOUS at 11:14

## 2024-02-24 RX ADMIN — HYDROCHLOROTHIAZIDE 12.5 MG: 25 TABLET ORAL at 11:14

## 2024-02-24 RX ADMIN — Medication 100 UNITS: at 11:12

## 2024-02-24 RX ADMIN — VALSARTAN 80 MG: 160 TABLET, FILM COATED ORAL at 11:13

## 2024-02-24 RX ADMIN — SODIUM CHLORIDE, PRESERVATIVE FREE 10 ML: 5 INJECTION INTRAVENOUS at 11:16

## 2024-02-24 RX ADMIN — ASPIRIN 81 MG CHEWABLE TABLET 81 MG: 81 TABLET CHEWABLE at 11:13

## 2024-02-25 LAB
ANION GAP SERPL CALC-SCNC: 3 MMOL/L (ref 5–15)
BUN SERPL-MCNC: 12 MG/DL (ref 6–20)
BUN/CREAT SERPL: 11 (ref 12–20)
CALCIUM SERPL-MCNC: 8.5 MG/DL (ref 8.5–10.1)
CHLORIDE SERPL-SCNC: 109 MMOL/L (ref 97–108)
CK SERPL-CCNC: 1157 U/L (ref 39–308)
CO2 SERPL-SCNC: 26 MMOL/L (ref 21–32)
COMMENT:: NORMAL
CREAT SERPL-MCNC: 1.05 MG/DL (ref 0.7–1.3)
GLUCOSE SERPL-MCNC: 108 MG/DL (ref 65–100)
POTASSIUM SERPL-SCNC: 3.2 MMOL/L (ref 3.5–5.1)
SODIUM SERPL-SCNC: 138 MMOL/L (ref 136–145)
SPECIMEN HOLD: NORMAL

## 2024-02-25 PROCEDURE — 2060000000 HC ICU INTERMEDIATE R&B

## 2024-02-25 PROCEDURE — 82550 ASSAY OF CK (CPK): CPT

## 2024-02-25 PROCEDURE — 2580000003 HC RX 258: Performed by: INTERNAL MEDICINE

## 2024-02-25 PROCEDURE — 6370000000 HC RX 637 (ALT 250 FOR IP): Performed by: INTERNAL MEDICINE

## 2024-02-25 PROCEDURE — 36415 COLL VENOUS BLD VENIPUNCTURE: CPT

## 2024-02-25 PROCEDURE — 6360000002 HC RX W HCPCS: Performed by: INTERNAL MEDICINE

## 2024-02-25 PROCEDURE — 80048 BASIC METABOLIC PNL TOTAL CA: CPT

## 2024-02-25 RX ADMIN — SODIUM CHLORIDE, PRESERVATIVE FREE 10 ML: 5 INJECTION INTRAVENOUS at 08:43

## 2024-02-25 RX ADMIN — Medication 100 UNITS: at 08:40

## 2024-02-25 RX ADMIN — SENNOSIDES AND DOCUSATE SODIUM 1 TABLET: 8.6; 5 TABLET ORAL at 19:52

## 2024-02-25 RX ADMIN — SODIUM CHLORIDE, PRESERVATIVE FREE 10 ML: 5 INJECTION INTRAVENOUS at 19:52

## 2024-02-25 RX ADMIN — SENNOSIDES AND DOCUSATE SODIUM 1 TABLET: 8.6; 5 TABLET ORAL at 08:40

## 2024-02-25 RX ADMIN — ENOXAPARIN SODIUM 40 MG: 100 INJECTION SUBCUTANEOUS at 08:43

## 2024-02-25 RX ADMIN — VALSARTAN 80 MG: 160 TABLET, FILM COATED ORAL at 08:42

## 2024-02-25 RX ADMIN — ASPIRIN 81 MG CHEWABLE TABLET 81 MG: 81 TABLET CHEWABLE at 08:42

## 2024-02-25 RX ADMIN — POTASSIUM BICARBONATE 40 MEQ: 782 TABLET, EFFERVESCENT ORAL at 08:43

## 2024-02-25 RX ADMIN — Medication 2000 UNITS: at 08:40

## 2024-02-25 RX ADMIN — HYDROCHLOROTHIAZIDE 12.5 MG: 25 TABLET ORAL at 08:40

## 2024-02-25 ASSESSMENT — PAIN SCALES - GENERAL: PAINLEVEL_OUTOF10: 0

## 2024-02-26 VITALS
BODY MASS INDEX: 28.94 KG/M2 | OXYGEN SATURATION: 98 % | SYSTOLIC BLOOD PRESSURE: 108 MMHG | HEIGHT: 70 IN | DIASTOLIC BLOOD PRESSURE: 58 MMHG | HEART RATE: 71 BPM | RESPIRATION RATE: 16 BRPM | WEIGHT: 202.16 LBS | TEMPERATURE: 97.6 F

## 2024-02-26 PROCEDURE — 6370000000 HC RX 637 (ALT 250 FOR IP): Performed by: INTERNAL MEDICINE

## 2024-02-26 PROCEDURE — 2580000003 HC RX 258: Performed by: INTERNAL MEDICINE

## 2024-02-26 PROCEDURE — 6360000002 HC RX W HCPCS: Performed by: INTERNAL MEDICINE

## 2024-02-26 RX ADMIN — HYDROCHLOROTHIAZIDE 12.5 MG: 25 TABLET ORAL at 08:05

## 2024-02-26 RX ADMIN — SODIUM CHLORIDE, PRESERVATIVE FREE 10 ML: 5 INJECTION INTRAVENOUS at 08:06

## 2024-02-26 RX ADMIN — Medication 2000 UNITS: at 08:06

## 2024-02-26 RX ADMIN — SENNOSIDES AND DOCUSATE SODIUM 1 TABLET: 8.6; 5 TABLET ORAL at 08:05

## 2024-02-26 RX ADMIN — Medication 100 UNITS: at 08:06

## 2024-02-26 RX ADMIN — VALSARTAN 80 MG: 160 TABLET, FILM COATED ORAL at 08:06

## 2024-02-26 RX ADMIN — ASPIRIN 81 MG CHEWABLE TABLET 81 MG: 81 TABLET CHEWABLE at 08:05

## 2024-02-26 RX ADMIN — ENOXAPARIN SODIUM 40 MG: 100 INJECTION SUBCUTANEOUS at 08:05

## 2024-02-26 NOTE — DISCHARGE SUMMARY
due to risk for falls   - continue ASA 81mg daily      Traumatic rhabdomyolysis   s/p ground-level fall  - CK  improving , will monitor   - s/p IVF  - CT pelvis and XR R hip/pelvis: no acute process  - fall precautions  - PT/OT evals  - pain control     Acute renal insufficiency - resolved   - due to above  - CT pelvis showed no acute process  -  will monitor      Elevated AST- improving    - s/p IVF   - will monitor     Afebrile leucocytosis - resolved   - likely reactive  - CXR no acute process  - UA clean      HTN  - c/w home valsartan/HCTZ     Lumbar stenosis with neurogenic claudication  Arthritis  Hx prostate CA s/p prostatectomy    Discharged to [facility]     PENDING TEST RESULTS:   At the time of discharge the following test results are still pending: none     FOLLOW UP APPOINTMENTS:    [unfilled]   PCP in 1-2 weeks     ADDITIONAL CARE RECOMMENDATIONS:   Bmp in 1 week     DIET: regular diet  Oral Nutritional Supplements:     ACTIVITY: activity as tolerated    DISCHARGE MEDICATIONS:     Medication List        CONTINUE taking these medications      aspirin 81 MG chewable tablet     Cholecalciferol 50 MCG (2000 UT) Tabs     cyclobenzaprine 10 MG tablet  Commonly known as: FLEXERIL     potassium chloride 10 MEQ extended release tablet  Commonly known as: KLOR-CON     senna-docusate 8.6-50 MG per tablet  Commonly known as: PERICOLACE     valsartan-hydroCHLOROthiazide 80-12.5 MG per tablet  Commonly known as: DIOVAN-HCT     vitamin E 45 MG (100 UNIT) capsule            STOP taking these medications      HYDROmorphone 2 MG tablet  Commonly known as: DILAUDID                NOTIFY YOUR PHYSICIAN FOR ANY OF THE FOLLOWING:   Fever over 101 degrees for 24 hours.   Chest pain, shortness of breath, fever, chills, nausea, vomiting, diarrhea, change in mentation, falling, weakness, bleeding. Severe pain or pain not relieved by medications.  Or, any other signs or symptoms that you may have questions

## 2024-02-26 NOTE — PROGRESS NOTES
[facility] Adult  Hospitalist Group                                                                                          Hospitalist Progress Note  Baron Song MD  Office Phone: [phone]        Date of Service:  2024  NAME:  William Gould  :  1935  MRN:  [id number]       Admission Summary:   William Gould is a 88 y.o. male with HTN, lumbar stenosis with neurogenic claudication, decreased hearing, arthritis, chronic pain, h/o frequent falls, and h/o prostate CA s/p prostatectomy who was BIBEMS from home s/p ground-level fall around 0200. Pt was sitting on the edge of the bed about to go to the bathroom but fell off the edge of the bed onto the floor, hitting his tailbone; he denies head trauma/LOC. Pt was on the floor for at least 4-5 hours. His wife sleeps in a separate room, found him this morning, and called 911. Pt is currently only c/o bilateral groin pain. Pt reports he started taking fexofenadine for allergies 1 month ago and takes ASA 81mg daily. Pt reports frequent falls; he previously fell on  and sustained a L shoulder dislocation (treated at [facility]). Pt denies any CP, SOB, palpitations, f/c/n/v, abdominal pain, diarrhea/constipation, dysuria, back pain. In the ED, he was found to have rhabdo with CK 1748 and was given IVNS bolus. He was also found to have new onset atrial fibrillation with HR 80s-90s and denies a previous h/o arrhythmias.         Interval history / Subjective:   Seen and examined for fall, rhabdo, and afib. No acute complaints. Discussed waiting for SNF. No acute changes. No acute events overnight.      Assessment & Plan:     Possible atrial fibrillation   - rate controlled with HR 80s-90s  - Appreciate cardiology input - tele : possible SR with sinus arrhythmia and occasional PVCs   - echo report pending   - no AC recommended due to risk for falls   - continue ASA 81mg daily     Traumatic rhabdomyolysis   s/p ground-level 
        [facility] Adult  Hospitalist Group                                                                                          Hospitalist Progress Note  Sushma Madala, MD  Office Phone: [phone]        Date of Service:  2024  NAME:  William Gould  :  1935  MRN:  [id number]       Admission Summary:   William Gould is a 88 y.o. male with HTN, lumbar stenosis with neurogenic claudication, decreased hearing, arthritis, chronic pain, h/o frequent falls, and h/o prostate CA s/p prostatectomy who was BIBEMS from home s/p ground-level fall around 0200. Pt was sitting on the edge of the bed about to go to the bathroom but fell off the edge of the bed onto the floor, hitting his tailbone; he denies head trauma/LOC. Pt was on the floor for at least 4-5 hours. His wife sleeps in a separate room, found him this morning, and called 911. Pt is currently only c/o bilateral groin pain. Pt reports he started taking fexofenadine for allergies 1 month ago and takes ASA 81mg daily. Pt reports frequent falls; he previously fell on  and sustained a L shoulder dislocation (treated at [facility]). Pt denies any CP, SOB, palpitations, f/c/n/v, abdominal pain, diarrhea/constipation, dysuria, back pain. In the ED, he was found to have rhabdo with CK 1748 and was given IVNS bolus. He was also found to have new onset atrial fibrillation with HR 80s-90s and denies a previous h/o arrhythmias.         Interval history / Subjective:   Patient is seen and examined at bedside this AM. He feels ok. No new complaints. Agreeable to go to SNF   Discussed with nursing, cm        Assessment & Plan:     Possible atrial fibrillation   - rate controlled with HR 80s-90s  - Appreciate cardiology input - tele : possible SR with sinus arrhythmia and occasional PVCs   - echo report pending   - no AC recommended due to risk for falls   - continue ASA 81mg daily     Traumatic rhabdomyolysis   s/p ground-level fall  - CK  
        [facility] Adult  Hospitalist Group                                                                                          Hospitalist Progress Note  Sushma Madala, MD  Office Phone: [phone]        Date of Service:  2024  NAME:  William Gould  :  1935  MRN:  [id number]       Admission Summary:   William Gould is a 88 y.o. male with HTN, lumbar stenosis with neurogenic claudication, decreased hearing, arthritis, chronic pain, h/o frequent falls, and h/o prostate CA s/p prostatectomy who was BIBEMS from home s/p ground-level fall around 0200. Pt was sitting on the edge of the bed about to go to the bathroom but fell off the edge of the bed onto the floor, hitting his tailbone; he denies head trauma/LOC. Pt was on the floor for at least 4-5 hours. His wife sleeps in a separate room, found him this morning, and called 911. Pt is currently only c/o bilateral groin pain. Pt reports he started taking fexofenadine for allergies 1 month ago and takes ASA 81mg daily. Pt reports frequent falls; he previously fell on  and sustained a L shoulder dislocation (treated at [facility]). Pt denies any CP, SOB, palpitations, f/c/n/v, abdominal pain, diarrhea/constipation, dysuria, back pain. In the ED, he was found to have rhabdo with CK 1748 and was given IVNS bolus. He was also found to have new onset atrial fibrillation with HR 80s-90s and denies a previous h/o arrhythmias.         Interval history / Subjective:   Patient is seen and examined at bedside this AM. He feels ok.  Waiting for placement   Discussed with nursing       Assessment & Plan:     Possible atrial fibrillation   - rate controlled with HR 80s-90s  - Appreciate cardiology input - tele : possible SR with sinus arrhythmia and occasional PVCs   - echo: normal EF of 60 - 65%   - no AC recommended due to risk for falls   - continue ASA 81mg daily     Traumatic rhabdomyolysis   s/p ground-level fall  - CK  improving , will 
Bedside shift change report given to Milagros RN (oncoming nurse) by Valeri RN (offgoing nurse). Report included the following information Nurse Handoff Report.    
Bedside shift change report given to Zandra RN (oncoming nurse) by Valeri JACOBSON (offgoing nurse). Report included the following information Nurse Handoff Report.    
Bedside shift change report given to Zandra RN (oncoming nurse) by Valeri JACOBSON (offgoing nurse). Report included the following information Nurse Handoff Report.    
Occupational Therapy:     Chart reviewed in prep for OT tx session. Attempted to see pt, however declining participation in therapy at this time and requesting to rest. Will defer and follow up this PM as able and appropriate.     Thank you,   Ruby Briseno, OTD, OTR/L    
Received discharge order and notified pt. IVs removed and no bleeding or irritation present. Went over pharmacy, change in medications, follow up appointments, signs and symptoms of stroke and heart attack, and how to access mychart. Pt was removed from telemetry. [facility] at time of discharge. Report called to christine robles. AVS and chart given to transport.     
81mg daily     Traumatic rhabdomyolysis   s/p ground-level fall  - CK  worsened >7K  - increased IVF  - CT pelvis and XR R hip/pelvis: no acute process  - fall precautions  - PT/OT evals  - pain control     Acute renal insufficiency - resolved   - due to above  - CT pelvis showed no acute process  - restarted home valsartan/HCTZ  -  will monitor      Elevated AST- worsened   - continue IVF   - will monitor     Afebrile leucocytosis - resolved   - likely reactive  - CXR no acute process  - UA clean      HTN  - restarted home valsartan/HCTZ     Lumbar stenosis with neurogenic claudication  Arthritis  Hx prostate CA s/p prostatectomy     Code status: DNR  Prophylaxis: lovenox   Care Plan discussed with: pt, RN, CM  Anticipated Disposition: TBD  Inpatient  Cardiac monitoring: Telemetry  Central Line:            Social Determinants of Health     Tobacco Use: Not on file   Alcohol Use: Not on file   Financial Resource Strain: Not on file   Food Insecurity: Not on file   Transportation Needs: Not on file   Physical Activity: Not on file   Stress: Not on file   Social Connections: Not on file   Intimate Partner Violence: Not on file   Depression: Not on file   Housing Stability: Not on file   Interpersonal Safety: Not on file   Utilities: Not on file       Review of Systems:   A comprehensive review of systems was negative.       Vital Signs:    Last 24hrs VS reviewed since prior progress note. Most recent are:  Vitals:    02/22/24 1000   BP:    Pulse: 85   Resp:    Temp:    SpO2:          Intake/Output Summary (Last 24 hours) at 2/22/2024 1153  Last data filed at 2/22/2024 0900  Gross per 24 hour   Intake 1350.01 ml   Output 1050 ml   Net 300.01 ml        Physical Examination:     I had a face to face encounter with this patient and independently examined them on 2/22/2024 as outlined below:          General : alert x 3, awake, no acute distress,   HEENT: PEERL, EOMI, moist mucus membrane  Neck: supple, no JVD, no

## 2024-02-26 NOTE — DISCHARGE INSTRUCTIONS
Discharge SNF/Rehab Instructions/LTAC       PATIENT ID: William Gould  MRN: [id number]   YOB: 1935    DATE OF ADMISSION: [unfilled]    DATE OF DISCHARGE: 2/26/2024    PRIMARY CARE PROVIDER: @PCP@       ATTENDING PHYSICIAN: [unfilled]  DISCHARGING PROVIDER: Sushma Madala, MD     To contact this individual call [phone] and ask the  to page.   If unavailable ask to be transferred the Adult Hospitalist Department.    CONSULTATIONS: [unfilled]    PROCEDURES/SURGERIES: * No surgery found *    DIAGNOSES & HOSPITAL COURSE:   Possible atrial fibrillation   - rate controlled with HR 80s-90s  - Appreciate cardiology input - tele : possible SR with sinus arrhythmia and occasional PVCs   - echo: normal EF of 60 - 65%   - no AC recommended due to risk for falls   - continue ASA 81mg daily      Traumatic rhabdomyolysis   s/p ground-level fall  - CK  improving , will monitor   - s/p IVF  - CT pelvis and XR R hip/pelvis: no acute process  - fall precautions  - PT/OT evals  - pain control     Acute renal insufficiency - resolved   - due to above  - CT pelvis showed no acute process  -  will monitor      Elevated AST- improving    - s/p IVF   - will monitor     Afebrile leucocytosis - resolved   - likely reactive  - CXR no acute process  - UA clean      HTN  - c/w home valsartan/HCTZ     Lumbar stenosis with neurogenic claudication  Arthritis  Hx prostate CA s/p prostatectomy      PENDING TEST RESULTS:   At the time of discharge the following test results are still pending: none    FOLLOW UP APPOINTMENTS:    [unfilled]  PCP in 1-2 weeks          ADDITIONAL CARE RECOMMENDATIONS:   BMP in 1 week     DIET: {diet:26323}  Oral Nutritional Supplements: {RDSUPPLEMENTLIST:20094} {RDSUPPFREQUENCY:20080}    TUBE FEEDING INSTRUCTIONS: ***    OXYGEN / BiPAP SETTINGS: ***    ACTIVITY: {discharge activity:44270}    WOUND CARE: ***    EQUIPMENT needed: ***      DISCHARGE MEDICATIONS:   See Medication

## 2024-02-26 NOTE — CARE COORDINATION
Transition of Care Plan:    RUR: Unknown  Prior Level of Functioning: Independent  Dis[facility]    RN call report to [phone]  If SNF or IPR: Date FOC offered: 2/22  Date FOC received: 2/22  Accepting facility: [facility]  Date authorization started with reference number:   Date authorization received and expires:   Follow up appointments: PCP/Specialist  DME needed: None  Transportation at discharge: [facility] scheduled for 1:30pm  IM/[facility] Medicare/ letter given: 2/26  Is patient a  and connected with [facility]? No   If yes, was  transfer form completed and VA notified?   Caregiver Contact: Sara Gould [phone]  Discharge Caregiver contacted prior to discharge? Yes  Care Conference needed? No  Barriers to discharge:  None    Shireen Washburn, RN/CRM  [phone]

## 2024-02-27 ENCOUNTER — OFFICE VISIT (OUTPATIENT)
Facility: CLINIC | Age: 89
End: 2024-02-27
Payer: MEDICARE

## 2024-02-27 DIAGNOSIS — M48.062 LUMBAR STENOSIS WITH NEUROGENIC CLAUDICATION: Primary | ICD-10-CM

## 2024-02-27 PROCEDURE — 1123F ACP DISCUSS/DSCN MKR DOCD: CPT | Performed by: INTERNAL MEDICINE

## 2024-02-27 PROCEDURE — 99306 1ST NF CARE HIGH MDM 50: CPT | Performed by: INTERNAL MEDICINE

## 2024-02-27 PROCEDURE — G8484 FLU IMMUNIZE NO ADMIN: HCPCS | Performed by: INTERNAL MEDICINE

## 2024-02-28 ENCOUNTER — OFFICE VISIT (OUTPATIENT)
Facility: CLINIC | Age: 89
End: 2024-02-28
Payer: MEDICARE

## 2024-02-28 DIAGNOSIS — M48.062 LUMBAR STENOSIS WITH NEUROGENIC CLAUDICATION: Primary | ICD-10-CM

## 2024-02-28 PROCEDURE — 99309 SBSQ NF CARE MODERATE MDM 30: CPT | Performed by: INTERNAL MEDICINE

## 2024-02-28 PROCEDURE — 1123F ACP DISCUSS/DSCN MKR DOCD: CPT | Performed by: INTERNAL MEDICINE

## 2024-02-28 PROCEDURE — G8484 FLU IMMUNIZE NO ADMIN: HCPCS | Performed by: INTERNAL MEDICINE

## 2024-02-28 NOTE — PROGRESS NOTES
PLACE OF SERVICE:  [facility] [address] [place] [address]    SKILLED VISIT    2/28/2024        Chief Complaint:    Repeated falls  Lumbar spinal stenosis with claudication  Rhabdomyolysis        HPI : William Gould is a 88 y.o. male history of hypertension history of lumbar spinal stenosis with claudication and history of frequent falls reports falling at home in the middle of the night.  Patient was trying to get out of bed was sitting on the edge of the bed and slid to the floor.  He was unable to get up and was on the floor for 4 to 5 hours.  He was found covered in feces.  In the ER his CT head was negative CT of the abdomen pelvis is unremarkable CPK was 1700.  White count was 17,000 he was in A-fib.  He was admitted to the hospitalist team.  He was seen by cardiology echo was normal.  Cardiology impression was artifact with sinus rhythm and PVCs rather than A-fib.  Cardiology recommended aspirin no anticoagulant.  Because of fall risk.  His renal function improved with IV hydration.  CPK trended down.  His white count improved with hydration.  Did not require antibiotics.  Patient is debilitated weak and admitted here for skilled rehab.  Patient is seen for skilled visit.  Slept okay last night today he tells me that he has more issues with the right lower extremity giving up when he walks.  He describes pain starting in the right groin sometimes radiating in the front of the thigh and to the knee and his leg puja.  He denies any back pain no other symptoms.  The symptoms have resulted in falls.  Patient reports he has been seen by neurologist in the past does not remember seeing orthopedic doctor will refer to Ortho for evaluation.    PMH:   Hypertension  Lumbar spinal stenosis  Spinal fusion surgery x 2  Dislocated left shoulder SP closed reduction  Prostrate CA SP prostatectomy          ROS:   The following system review was negative:  Constitutional; Respiratory;

## 2024-02-28 NOTE — PROGRESS NOTES
PLACE OF SERVICE:  [facility] [address] [place] [address]    H & P      Chief Complaint:    Repeated falls  Lumbar spinal stenosis with claudication  Rhabdomyolysis        HPI : William Gould is a 88 y.o. male history of hypertension history of lumbar spinal stenosis with claudication and history of frequent falls reports falling at home in the middle of the night.  Patient was trying to get out of bed was sitting on the edge of the bed and slid to the floor.  He was unable to get up and was on the floor for 4 to 5 hours.  He was found covered in feces.  In the ER his CT head was negative CT of the abdomen pelvis is unremarkable CPK was 1700.  White count was 17,000 he was in A-fib.  He was admitted to the hospitalist team.  He was seen by cardiology echo was normal.  Cardiology impression was artifact with sinus rhythm and PVCs rather than A-fib.  Cardiology recommended aspirin no anticoagulant.  Because of fall risk.  His renal function improved with IV hydration.  CPK trended down.  His white count improved with hydration.  Did not require antibiotics.  Patient is debilitated weak and admitted here for skilled rehab.    PMH:   Hypertension  Lumbar spinal stenosis  Spinal fusion surgery x 2  Dislocated left shoulder SP closed reduction  Prostrate CA SP prostatectomy          ROS:   The following system review was negative:  Constitutional; Respiratory; Cardiovascular; Genitourinary; Gastrointestinal; Psychiatric; Ear-Nose-Throat; Musculoskeletal; Neurologic; Endocrine; Hematologic; Skin; Eyes; denies any    Medications: Reviewed in EMR and assessment and plan    Social History: Lives with his wife  Family History: Noncontributory     Code Status:    Allergies: Reviewed  Allergies   Allergen Reactions    Hydrocodone Hives    Oxycodone-Acetaminophen Itching    Penicillins Hives and Rash       Medications: Reviewed    Vitals:   /69 pulse 68 respiration 18 temperature 97.7

## 2024-02-29 ENCOUNTER — OFFICE VISIT (OUTPATIENT)
Facility: CLINIC | Age: 89
End: 2024-02-29

## 2024-02-29 VITALS
DIASTOLIC BLOOD PRESSURE: 69 MMHG | OXYGEN SATURATION: 96 % | RESPIRATION RATE: 16 BRPM | SYSTOLIC BLOOD PRESSURE: 110 MMHG | HEART RATE: 68 BPM | TEMPERATURE: 97.7 F

## 2024-02-29 DIAGNOSIS — M48.062 LUMBAR STENOSIS WITH NEUROGENIC CLAUDICATION: ICD-10-CM

## 2024-02-29 DIAGNOSIS — E87.6 HYPOKALEMIA: ICD-10-CM

## 2024-02-29 DIAGNOSIS — I10 PRIMARY HYPERTENSION: ICD-10-CM

## 2024-02-29 DIAGNOSIS — T79.6XXD TRAUMATIC RHABDOMYOLYSIS, SUBSEQUENT ENCOUNTER: ICD-10-CM

## 2024-02-29 DIAGNOSIS — R29.6 REPEATED FALLS: ICD-10-CM

## 2024-02-29 PROBLEM — T79.6XXA TRAUMATIC RHABDOMYOLYSIS (HCC): Status: ACTIVE | Noted: 2024-02-29

## 2024-02-29 ASSESSMENT — ENCOUNTER SYMPTOMS
RESPIRATORY NEGATIVE: 1
GASTROINTESTINAL NEGATIVE: 1

## 2024-02-29 NOTE — PROGRESS NOTES
claudication  Overview:  Needs follow up with his spine surgeron  Add tylenol PRN.   Continue PT/OT  3. Traumatic rhabdomyolysis, subsequent encounter  Overview:  Secondary to found down after fall  Treated with IV fluid hydration  CPK trended down prior to discharge.    Repeat BMP ordered.   4. Primary hypertension  Overview:  Continue valsartan/HCTZ 80/12.5 once a day  5. Hypokalemia  Overview:  Continue potassium chloride 10 mEq daily  Repeat BMP ordered           ALAN Swanson - SONIA

## 2024-03-01 NOTE — PLAN OF CARE
Problem: Discharge Planning  Goal: Discharge to home or other facility with appropriate resources  2/22/2024 2225 by Abdias Art RN  Outcome: Progressing  2/22/2024 1607 by Anthony Hendricks RN  Outcome: Progressing  Flowsheets (Taken 2/22/2024 0800)  Discharge to home or other facility with appropriate resources: Identify barriers to discharge with patient and caregiver     Problem: Pain  Goal: Verbalizes/displays adequate comfort level or baseline comfort level  2/22/2024 2225 by Abdias Art RN  Outcome: Progressing  2/22/2024 1607 by Anthony Hendricks RN  Outcome: Progressing     Problem: Safety - Adult  Goal: Free from fall injury  2/22/2024 2225 by Abdias Art RN  Outcome: Progressing  2/22/2024 1607 by Anthony Hendricks RN  Outcome: Progressing     Problem: Skin/Tissue Integrity  Goal: Absence of new skin breakdown  Description: 1.  Monitor for areas of redness and/or skin breakdown  2.  Assess vascular access sites hourly  3.  Every 4-6 hours minimum:  Change oxygen saturation probe site  4.  Every 4-6 hours:  If on nasal continuous positive airway pressure, respiratory therapy assess nares and determine need for appliance change or resting period.  2/22/2024 2225 by Abdisa Art RN  Outcome: Progressing  2/22/2024 1607 by Anthony Hendricks RN  Outcome: Progressing     Problem: Occupational Therapy - Adult  Goal: By Discharge: Performs self-care activities at highest level of function for planned discharge setting.  See evaluation for individualized goals.  Description: FUNCTIONAL STATUS PRIOR TO ADMISSION:  Patient was ambulatory using a rollator.     HOME SUPPORT: Patient lived with his wife but didn't require assistance.    Occupational Therapy Goals:  Initiated 2/22/2024  1.  Patient will perform standing grooming routine with Minimal Assist within 7 day(s).  2.  Patient will perform upper and lower body bathing with Minimal Assist within 7 day(s).  3.  Patient will perform lower body dressing 
  Problem: Discharge Planning  Goal: Discharge to home or other facility with appropriate resources  2/24/2024 1242 by Zandra Ardon RN  Outcome: Progressing  2/24/2024 0719 by Valeri Enamorado RN  Outcome: Progressing  Flowsheets (Taken 2/23/2024 2000)  Discharge to home or other facility with appropriate resources:   Identify barriers to discharge with patient and caregiver   Arrange for needed discharge resources and transportation as appropriate     Problem: Pain  Goal: Verbalizes/displays adequate comfort level or baseline comfort level  2/24/2024 1242 by Zandra Ardon RN  Outcome: Progressing  2/24/2024 0719 by Valeri Enamorado RN  Outcome: Progressing  Flowsheets (Taken 2/23/2024 2000)  Verbalizes/displays adequate comfort level or baseline comfort level:   Encourage patient to monitor pain and request assistance   Assess pain using appropriate pain scale   Administer analgesics based on type and severity of pain and evaluate response   Implement non-pharmacological measures as appropriate and evaluate response     Problem: Safety - Adult  Goal: Free from fall injury  2/24/2024 1242 by Zandra Ardon RN  Outcome: Progressing  2/24/2024 0719 by Valeri Enamorado RN  Outcome: Progressing     Problem: Skin/Tissue Integrity  Goal: Absence of new skin breakdown  Description: 1.  Monitor for areas of redness and/or skin breakdown  2.  Assess vascular access sites hourly  3.  Every 4-6 hours minimum:  Change oxygen saturation probe site  4.  Every 4-6 hours:  If on nasal continuous positive airway pressure, respiratory therapy assess nares and determine need for appliance change or resting period.  2/24/2024 1242 by Zandra Ardon RN  Outcome: Progressing  2/24/2024 0719 by Valeri Enamorado RN  Outcome: Progressing     
  Problem: Discharge Planning  Goal: Discharge to home or other facility with appropriate resources  2/26/2024 1019 by Zandra Ardon RN  Outcome: Progressing  Flowsheets (Taken 2/26/2024 0800)  Discharge to home or other facility with appropriate resources: Identify barriers to discharge with patient and caregiver  2/26/2024 0626 by Valeri Enamorado RN  Outcome: Progressing  Flowsheets (Taken 2/25/2024 2000)  Discharge to home or other facility with appropriate resources: Identify barriers to discharge with patient and caregiver     Problem: Pain  Goal: Verbalizes/displays adequate comfort level or baseline comfort level  2/26/2024 1019 by Zandra Ardon RN  Outcome: Progressing  2/26/2024 0626 by Valeri Enamorado RN  Outcome: Progressing     Problem: Safety - Adult  Goal: Free from fall injury  2/26/2024 1019 by Zandra Ardon RN  Outcome: Progressing  2/26/2024 0626 by Valeri Enamorado RN  Outcome: Progressing     Problem: Skin/Tissue Integrity  Goal: Absence of new skin breakdown  Description: 1.  Monitor for areas of redness and/or skin breakdown  2.  Assess vascular access sites hourly  3.  Every 4-6 hours minimum:  Change oxygen saturation probe site  4.  Every 4-6 hours:  If on nasal continuous positive airway pressure, respiratory therapy assess nares and determine need for appliance change or resting period.  2/26/2024 1019 by Zandra Ardon RN  Outcome: Progressing  2/26/2024 0626 by Valeri Enamorado RN  Outcome: Progressing     
  Problem: Discharge Planning  Goal: Discharge to home or other facility with appropriate resources  Outcome: Progressing     Problem: Pain  Goal: Verbalizes/displays adequate comfort level or baseline comfort level  Outcome: Progressing     Problem: Safety - Adult  Goal: Free from fall injury  Outcome: Progressing     
  Problem: Discharge Planning  Goal: Discharge to home or other facility with appropriate resources  Outcome: Progressing  Flowsheets (Taken 2/23/2024 0800)  Discharge to home or other facility with appropriate resources:   Identify barriers to discharge with patient and caregiver   Arrange for needed discharge resources and transportation as appropriate     Problem: Pain  Goal: Verbalizes/displays adequate comfort level or baseline comfort level  Outcome: Progressing  Flowsheets (Taken 2/23/2024 0701)  Verbalizes/displays adequate comfort level or baseline comfort level:   Encourage patient to monitor pain and request assistance   Assess pain using appropriate pain scale     Problem: Safety - Adult  Goal: Free from fall injury  Outcome: Progressing     Problem: Skin/Tissue Integrity  Goal: Absence of new skin breakdown  Description: 1.  Monitor for areas of redness and/or skin breakdown  2.  Assess vascular access sites hourly  3.  Every 4-6 hours minimum:  Change oxygen saturation probe site  4.  Every 4-6 hours:  If on nasal continuous positive airway pressure, respiratory therapy assess nares and determine need for appliance change or resting period.  Outcome: Progressing     Problem: Physical Therapy - Adult  Goal: By Discharge: Performs mobility at highest level of function for planned discharge setting.  See evaluation for individualized goals.  Description: FUNCTIONAL STATUS PRIOR TO ADMISSION: Patient was modified independent using a rollator for functional mobility, numerous falls    HOME SUPPORT PRIOR TO ADMISSION: The patient lived with his wife.    Physical Therapy Goals  Initiated 2/22/2024  1.  Patient will move from supine to sit and sit to supine in bed with supervision/set-up within 7 day(s).    2.  Patient will perform sit to stand with supervision/set-up within 7 day(s).  3.  Patient will transfer from bed to chair and chair to bed with supervision/set-up using the least restrictive device within 
  Problem: Discharge Planning  Goal: Discharge to home or other facility with appropriate resources  Outcome: Progressing  Flowsheets (Taken 2/23/2024 2000)  Discharge to home or other facility with appropriate resources:   Identify barriers to discharge with patient and caregiver   Arrange for needed discharge resources and transportation as appropriate     Problem: Pain  Goal: Verbalizes/displays adequate comfort level or baseline comfort level  Outcome: Progressing  Flowsheets (Taken 2/23/2024 2000)  Verbalizes/displays adequate comfort level or baseline comfort level:   Encourage patient to monitor pain and request assistance   Assess pain using appropriate pain scale   Administer analgesics based on type and severity of pain and evaluate response   Implement non-pharmacological measures as appropriate and evaluate response     Problem: Safety - Adult  Goal: Free from fall injury  Outcome: Progressing     Problem: Skin/Tissue Integrity  Goal: Absence of new skin breakdown  Description: 1.  Monitor for areas of redness and/or skin breakdown  2.  Assess vascular access sites hourly  3.  Every 4-6 hours minimum:  Change oxygen saturation probe site  4.  Every 4-6 hours:  If on nasal continuous positive airway pressure, respiratory therapy assess nares and determine need for appliance change or resting period.  Outcome: Progressing     
  Problem: Discharge Planning  Goal: Discharge to home or other facility with appropriate resources  Outcome: Progressing  Flowsheets (Taken 2/24/2024 2000)  Discharge to home or other facility with appropriate resources: Identify barriers to discharge with patient and caregiver     Problem: Pain  Goal: Verbalizes/displays adequate comfort level or baseline comfort level  Outcome: Progressing  Flowsheets (Taken 2/24/2024 2000)  Verbalizes/displays adequate comfort level or baseline comfort level:   Encourage patient to monitor pain and request assistance   Assess pain using appropriate pain scale   Administer analgesics based on type and severity of pain and evaluate response   Implement non-pharmacological measures as appropriate and evaluate response     Problem: Safety - Adult  Goal: Free from fall injury  Outcome: Progressing     Problem: Skin/Tissue Integrity  Goal: Absence of new skin breakdown  Description: 1.  Monitor for areas of redness and/or skin breakdown  2.  Assess vascular access sites hourly  3.  Every 4-6 hours minimum:  Change oxygen saturation probe site  4.  Every 4-6 hours:  If on nasal continuous positive airway pressure, respiratory therapy assess nares and determine need for appliance change or resting period.  Outcome: Progressing     
  Problem: Discharge Planning  Goal: Discharge to home or other facility with appropriate resources  Outcome: Progressing  Flowsheets (Taken 2/25/2024 2000)  Discharge to home or other facility with appropriate resources: Identify barriers to discharge with patient and caregiver     Problem: Pain  Goal: Verbalizes/displays adequate comfort level or baseline comfort level  Outcome: Progressing     Problem: Safety - Adult  Goal: Free from fall injury  Outcome: Progressing     Problem: Skin/Tissue Integrity  Goal: Absence of new skin breakdown  Description: 1.  Monitor for areas of redness and/or skin breakdown  2.  Assess vascular access sites hourly  3.  Every 4-6 hours minimum:  Change oxygen saturation probe site  4.  Every 4-6 hours:  If on nasal continuous positive airway pressure, respiratory therapy assess nares and determine need for appliance change or resting period.  Outcome: Progressing     
Patient's  returned call, I informed him to take his wife to the ER as she needs to be evaluated for possible DVT. He verbalized understanding.   
  Gait  Overall Level of Assistance: Assist X1;Contact-guard assistance  Assistive Device: Gait belt;Walker, rolling  Base of Support: Narrowed;Shift to left  Step Length: Left shortened;Right shortened    Activity Tolerance:   Good    After treatment:   Patient left in no apparent distress in bed, Call bell within reach, Caregiver / family present, and Heels elevated for pressure relief      COMMUNICATION/EDUCATION:   The patient's plan of care was discussed with: registered nurse    Patient Education  Education Given To: Patient;Family  Education Provided: Role of Therapy;Plan of Care;Transfer Training      Silvana Cole, PT  Minutes: 29      
3-in-1 commode  Home Equipment: Rollator, Cane, Walker, standard, Wheelchair-manual  Has the patient had two or more falls in the past year or any fall with injury in the past year?: Yes  Receives Help From: Family  ADL Assistance: Independent  Homemaking Assistance: Independent  Ambulation Assistance: Independent (w/ rollator walker, numerous falls)  Transfer Assistance: Independent  Active : No  Mode of Transportation: SUV  Occupation: Retired    Cognitive/Behavioral Status:  Orientation  Overall Orientation Status: Within Normal Limits  Orientation Level: Oriented X4  Cognition  Overall Cognitive Status: WNL         Vision/Perceptual:    Vision - Basic Assessment  Prior Vision: No visual deficits  Visual History: No significant visual history  Patient Visual Report: No visual complaint reported.  Visual Field Cut: No  Oculo Motor Control: WNL        Perception  Overall Perceptual Status: WFL    Strength:    Strength: Generally decreased, functional    Tone & Sensation:   Tone: Normal       Coordination:  Coordination: Generally decreased, functional    Range Of Motion:  AROM: Generally decreased, functional       Functional Mobility:  Bed Mobility: (w/ OT earlier today)  Rolling: Minimum assistance  Supine to Sit: Minimum assistance;Additional time  Scooting: Contact-guard assistance  Transfers:     Transfer Training  Transfer Training: Yes  Interventions: Visual cues;Safety awareness training  Sit to Stand: Minimum assistance;Assist X1  Stand to Sit: Assist X1;Moderate assistance (poor eccentric control)  Bed to Chair: Moderate assistance;Maximum assistance;Assist X1;Adaptive equipment  Balance:               Balance  Sitting: Impaired  Sitting - Static: Good (unsupported)  Sitting - Dynamic: Fair (occasional)  Standing: Impaired  Standing - Static: Fair;Constant support  Standing - Dynamic: Poor;Constant support  Ambulation/Gait Training:  Unable to safely attempt today.  Ambulated 10 steps in place x2 w/ 
         Feeding: Independent       Grooming: Setup  Grooming Skilled Clinical Factors: seated in bedside chair, to wash face    UE Bathing: Minimal assistance  UE Bathing Skilled Clinical Factors: inferred, seated         LE Bathing: Maximum assistance  LE Bathing Skilled Clinical Factors: inferred, up to max A for distal/posterior portions    UE Dressing: Minimal assistance  UE Dressing Skilled Clinical Factors: seated EOB, to [facility] hospital gown    LE Dressing: Maximum assistance  LE Dressing Skilled Clinical Factors: to [place] socks and underwear at EOB    Toileting: Maximum assistance  Toileting Skilled Clinical Factors: inferred, up to max A to maintain balance and complete posterior hygiene         ADL Intervention and task modifications:      [facility]-PACTM \"6 Clicks\"                                                       Daily Activity Inpatient Short Form  AM-PAC Daily Activity - Inpatient   How much help is needed for putting on and taking off regular lower body clothing?: A Lot  How much help is needed for bathing (which includes washing, rinsing, drying)?: A Lot  How much help is needed for toileting (which includes using toilet, bedpan, or urinal)?: A Lot  How much help is needed for putting on and taking off regular upper body clothing?: A Little  How much help is needed for taking care of personal grooming?: A Little  How much help for eating meals?: None  AM-[facility] Inpatient Daily Activity Raw Score: 16  AM-PAC Inpatient ADL T-Scale Score : 35.96  ADL Inpatient CMS 0-100% Score: 53.32  ADL Inpatient CMS G-Code Modifier : CK     Interpretation of Tool:  Represents clinically-significant functional categories (i.e. Activities of daily living).  Cutoff score 39.4 (19) correlates to a good likelihood of discharging home versus a facility  Veronica Baig, May Monson, August Coleman, Kaitlin Zaldivar, Stephane Smith, Lemuel Baig, AM-PAC “6-Clicks” Functional Assessment Scores Predict

## 2024-03-04 ENCOUNTER — OFFICE VISIT (OUTPATIENT)
Facility: CLINIC | Age: 89
End: 2024-03-04
Payer: MEDICARE

## 2024-03-04 DIAGNOSIS — M48.062 LUMBAR STENOSIS WITH NEUROGENIC CLAUDICATION: Primary | ICD-10-CM

## 2024-03-04 PROCEDURE — 1123F ACP DISCUSS/DSCN MKR DOCD: CPT | Performed by: INTERNAL MEDICINE

## 2024-03-04 PROCEDURE — G8484 FLU IMMUNIZE NO ADMIN: HCPCS | Performed by: INTERNAL MEDICINE

## 2024-03-04 PROCEDURE — 99309 SBSQ NF CARE MODERATE MDM 30: CPT | Performed by: INTERNAL MEDICINE

## 2024-03-04 NOTE — PROGRESS NOTES
PLACE OF SERVICE:  [facility] [address] [place] [address]      3/4/2024    Chief Complaint:    Repeated falls  Lumbar spinal stenosis with claudication  Rhabdomyolysis        HPI : William Gould is a 88 y.o. male history of hypertension history of lumbar spinal stenosis with claudication and history of frequent falls reports falling at home in the middle of the night.  Patient was trying to get out of bed was sitting on the edge of the bed and slid to the floor.  He was unable to get up and was on the floor for 4 to 5 hours.  He was found covered in feces.  In the ER his CT head was negative CT of the abdomen pelvis is unremarkable CPK was 1700.  White count was 17,000 he was in A-fib.  He was admitted to the hospitalist team.  He was seen by cardiology echo was normal.  Cardiology impression was artifact with sinus rhythm and PVCs rather than A-fib.  Cardiology recommended aspirin no anticoagulant.  Because of fall risk.  His renal function improved with IV hydration.  CPK trended down.  His white count improved with hydration.  Did not require antibiotics.  Patient is debilitated weak and admitted here for skilled rehab.  Patient is seen for skilled visit.  Continues to work with PT OT still unsteady with the feet walking is unsteady.  PMH:   Hypertension  Lumbar spinal stenosis  Spinal fusion surgery x 2  Dislocated left shoulder SP closed reduction  Prostrate CA SP prostatectomy          ROS:   The following system review was negative:  Constitutional; Respiratory; Cardiovascular; Genitourinary; Gastrointestinal; Psychiatric; Ear-Nose-Throat; Musculoskeletal; Neurologic; Endocrine; Hematologic; Skin; Eyes; denies any    Medications: Reviewed in EMR and assessment and plan    Social History: Lives with his wife  Family History: Noncontributory     Code Status:    Allergies: Reviewed  Allergies   Allergen Reactions    Hydrocodone Hives    Oxycodone-Acetaminophen Itching

## 2024-03-05 ENCOUNTER — OFFICE VISIT (OUTPATIENT)
Facility: CLINIC | Age: 89
End: 2024-03-05
Payer: MEDICARE

## 2024-03-05 VITALS
RESPIRATION RATE: 18 BRPM | DIASTOLIC BLOOD PRESSURE: 76 MMHG | OXYGEN SATURATION: 97 % | TEMPERATURE: 97 F | SYSTOLIC BLOOD PRESSURE: 134 MMHG | HEART RATE: 76 BPM

## 2024-03-05 DIAGNOSIS — E87.6 HYPOKALEMIA: ICD-10-CM

## 2024-03-05 DIAGNOSIS — T79.6XXD TRAUMATIC RHABDOMYOLYSIS, SUBSEQUENT ENCOUNTER: ICD-10-CM

## 2024-03-05 DIAGNOSIS — M48.062 LUMBAR STENOSIS WITH NEUROGENIC CLAUDICATION: ICD-10-CM

## 2024-03-05 DIAGNOSIS — I10 PRIMARY HYPERTENSION: ICD-10-CM

## 2024-03-05 DIAGNOSIS — R29.6 REPEATED FALLS: Primary | ICD-10-CM

## 2024-03-05 PROCEDURE — 99309 SBSQ NF CARE MODERATE MDM 30: CPT | Performed by: NURSE PRACTITIONER

## 2024-03-05 PROCEDURE — 1123F ACP DISCUSS/DSCN MKR DOCD: CPT | Performed by: NURSE PRACTITIONER

## 2024-03-05 PROCEDURE — G8484 FLU IMMUNIZE NO ADMIN: HCPCS | Performed by: NURSE PRACTITIONER

## 2024-03-05 ASSESSMENT — ENCOUNTER SYMPTOMS
RESPIRATORY NEGATIVE: 1
GASTROINTESTINAL NEGATIVE: 1

## 2024-03-05 NOTE — PROGRESS NOTES
PLACE OF SERVICE:  [facility] [address] [place] [address]    SKILLED VISIT    3/5/2024    Chief Complaint:    Chief Complaint   Patient presents with    Follow-up       HPI : William Gould is a 88 y.o. male  with history of hypertension history of lumbar spinal stenosis with claudication and history of frequent falls reports falling at home in the middle of the night.  Patient was trying to get out of bed was sitting on the edge of the bed and slid to the floor.  He was unable to get up and was on the floor for 4 to 5 hours.  He was found covered in feces.  In the ER his CT head was negative CT of the abdomen pelvis is unremarkable CPK was 1700.  White count was 17,000 he was in A-fib.  He was admitted to the hospitalist team.  He was seen by cardiology echo was normal.  Cardiology impression was artifact with sinus rhythm and PVCs rather than A-fib.  Cardiology recommended aspirin no anticoagulant.  Because of fall risk.  His renal function improved with IV hydration.  CPK trended down.  His white count improved with hydration.  Did not require antibiotics.  Patient is debilitated weak and admitted here for skilled rehab.    Patient seen today in skilled follow up.  On exam he is sitting in his wheelchair.  Continue working with PT/OT therapy. Patient is complaining of some back discomfort- encourage use of PRN tylenol as he states he has not taken.  BMP and CBC reviewed and stable. Renal function WNL. Vital signs reviewed and stable.     PMH:   Hypertension  Lumbar spinal stenosis  Spinal fusion surgery x 2  Dislocated left shoulder SP closed reduction  Prostrate CA SP prostatectomy       Medications: Reviewed in EMR and assessment and plan    Social History / Family History:       reports that he has never smoked. He has never used smokeless tobacco. He reports current alcohol use. He reports that he does not use drugs.    Family History   Problem Relation Age of Onset    Anesth

## 2024-03-06 ENCOUNTER — OFFICE VISIT (OUTPATIENT)
Facility: CLINIC | Age: 89
End: 2024-03-06
Payer: MEDICARE

## 2024-03-06 VITALS
RESPIRATION RATE: 18 BRPM | SYSTOLIC BLOOD PRESSURE: 112 MMHG | OXYGEN SATURATION: 97 % | TEMPERATURE: 97 F | DIASTOLIC BLOOD PRESSURE: 58 MMHG | HEART RATE: 81 BPM

## 2024-03-06 DIAGNOSIS — I10 PRIMARY HYPERTENSION: ICD-10-CM

## 2024-03-06 DIAGNOSIS — T79.6XXD TRAUMATIC RHABDOMYOLYSIS, SUBSEQUENT ENCOUNTER: ICD-10-CM

## 2024-03-06 DIAGNOSIS — R29.6 REPEATED FALLS: Primary | ICD-10-CM

## 2024-03-06 DIAGNOSIS — M48.062 LUMBAR STENOSIS WITH NEUROGENIC CLAUDICATION: ICD-10-CM

## 2024-03-06 PROCEDURE — G8484 FLU IMMUNIZE NO ADMIN: HCPCS | Performed by: NURSE PRACTITIONER

## 2024-03-06 PROCEDURE — 1123F ACP DISCUSS/DSCN MKR DOCD: CPT | Performed by: NURSE PRACTITIONER

## 2024-03-06 PROCEDURE — 99309 SBSQ NF CARE MODERATE MDM 30: CPT | Performed by: NURSE PRACTITIONER

## 2024-03-06 ASSESSMENT — ENCOUNTER SYMPTOMS
RESPIRATORY NEGATIVE: 1
GASTROINTESTINAL NEGATIVE: 1

## 2024-03-06 NOTE — PROGRESS NOTES
Behavior: Behavior normal.         Thought Content: Thought content normal.       Labs:  Lab work from 3/4/2024 reviewed.  Sodium 137, potassium 4.1, chloride 103, carbon dioxide 25, BUN 22, creatinine 1.18, glucose 86  Albumin 3.1  Alk phos 75, ALT 23, AST 11, T. bili 0.63  WBC 7.59, hemoglobin 12.2, hematocrit 35.0, platelets 352    Lab work from 3/5/24 reviewed.  Sodium 139, potassium 4.1, chloride 103, carbon oxide 24, BUN 24, creatinine 1.21, glucose 81      Assessment/Plans:   1. Repeated falls  Overview:  He has a history of lumbar stenosis and spinal fusion surgery  He reports history of pain in the right groin radiating anteriorly occasionally causing falls  CT of the abdomen pelvis in the hospital unremarkable for significant spinal stenosis  Will refer to Ortho for evaluation  Admit here for skilled rehab  PT OT eval and treat    Fall overnight- no reported injuries.   2. Primary hypertension  Overview:  Patient BP low.   Discontinue valsartan/ HCTZ.  SBP range from . Currently asymptomatic.  Encourage PO fluid intake.   Monitor BP.   3. Lumbar stenosis with neurogenic claudication  Overview:  Needs follow up with his spine surgeron  Add tylenol PRN.   Continue PT/OT  4. Traumatic rhabdomyolysis, subsequent encounter  Overview:  Secondary to found down after fall  Treated with IV fluid hydration  CPK trended down prior to discharge.    BMP from 3/4/24 with stable renal function: BUN 22, creatinine 1.18         ALAN Swanson - NP

## 2024-03-07 ENCOUNTER — OFFICE VISIT (OUTPATIENT)
Facility: CLINIC | Age: 89
End: 2024-03-07

## 2024-03-07 VITALS
DIASTOLIC BLOOD PRESSURE: 77 MMHG | TEMPERATURE: 97.8 F | OXYGEN SATURATION: 96 % | SYSTOLIC BLOOD PRESSURE: 118 MMHG | RESPIRATION RATE: 18 BRPM | HEART RATE: 81 BPM

## 2024-03-07 DIAGNOSIS — I10 PRIMARY HYPERTENSION: ICD-10-CM

## 2024-03-07 DIAGNOSIS — U07.1 COVID-19: Primary | ICD-10-CM

## 2024-03-07 DIAGNOSIS — M48.062 LUMBAR STENOSIS WITH NEUROGENIC CLAUDICATION: ICD-10-CM

## 2024-03-07 DIAGNOSIS — T79.6XXD TRAUMATIC RHABDOMYOLYSIS, SUBSEQUENT ENCOUNTER: ICD-10-CM

## 2024-03-07 DIAGNOSIS — R29.6 REPEATED FALLS: ICD-10-CM

## 2024-03-07 ASSESSMENT — ENCOUNTER SYMPTOMS
GASTROINTESTINAL NEGATIVE: 1
COUGH: 1

## 2024-03-07 NOTE — PROGRESS NOTES
PLACE OF SERVICE:  [facility] [address] [place] [address]    SKILLED VISIT    3/7/2024    Chief Complaint:    Chief Complaint   Patient presents with    Follow-up    COVID        HPI : William Gould is a 88 y.o. male  with history of hypertension history of lumbar spinal stenosis with claudication and history of frequent falls reports falling at home in the middle of the night.  Patient was trying to get out of bed was sitting on the edge of the bed and slid to the floor.  He was unable to get up and was on the floor for 4 to 5 hours.  He was found covered in feces.  In the ER his CT head was negative CT of the abdomen pelvis is unremarkable CPK was 1700.  White count was 17,000 he was in A-fib.  He was admitted to the hospitalist team.  He was seen by cardiology echo was normal.  Cardiology impression was artifact with sinus rhythm and PVCs rather than A-fib.  Cardiology recommended aspirin no anticoagulant.  Because of fall risk.  His renal function improved with IV hydration.  CPK trended down.  His white count improved with hydration.  Did not require antibiotics.  Patient is debilitated weak and admitted here for skilled rehab.    Patient seen today in skilled follow up. Patient tested positive for COVID 19 overnight. On exam today, he states he feels tired and has started coughing up yellow/ white sputum. Start mucinex 600mg BID x 7 days. Monitor vital signs. Yesterday patient with soft BP. BP today WNL. He denies chest pain, shortness of breath, dizziness. Patient is alert and awake. BMP and CBC ordered for 3/11/24. Encourage PO fluid intake.  Patient endorses poor appetite. Monitor.  Continue working with PT/OT therapy.       PMH:   Hypertension  Lumbar spinal stenosis  Spinal fusion surgery x 2  Dislocated left shoulder SP closed reduction  Prostrate CA SP prostatectomy       Medications: Reviewed in EMR and assessment and plan    Social History / Family History:

## 2024-03-11 ENCOUNTER — OFFICE VISIT (OUTPATIENT)
Facility: CLINIC | Age: 89
End: 2024-03-11
Payer: MEDICARE

## 2024-03-11 DIAGNOSIS — U07.1 COVID-19: Primary | ICD-10-CM

## 2024-03-11 PROCEDURE — G8484 FLU IMMUNIZE NO ADMIN: HCPCS | Performed by: INTERNAL MEDICINE

## 2024-03-11 PROCEDURE — 99309 SBSQ NF CARE MODERATE MDM 30: CPT | Performed by: INTERNAL MEDICINE

## 2024-03-11 PROCEDURE — 1123F ACP DISCUSS/DSCN MKR DOCD: CPT | Performed by: INTERNAL MEDICINE

## 2024-03-11 ASSESSMENT — ENCOUNTER SYMPTOMS
GASTROINTESTINAL NEGATIVE: 1
COUGH: 1

## 2024-03-11 NOTE — PROGRESS NOTES
PLACE OF SERVICE:  [facility] [place] [address]    SKILLED VISIT    3/11/2024    Chief Complaint:    14 Day Recert      HPI : William Gould is a 88 y.o. male  with history of hypertension history of lumbar spinal stenosis with claudication and history of frequent falls reports falling at home in the middle of the night.  Patient was trying to get out of bed was sitting on the edge of the bed and slid to the floor.  He was unable to get up and was on the floor for 4 to 5 hours.  He was found covered in feces.  In the ER his CT head was negative CT of the abdomen pelvis is unremarkable CPK was 1700.  White count was 17,000 he was in A-fib.  He was admitted to the hospitalist team.  He was seen by cardiology echo was normal.  Cardiology impression was artifact with sinus rhythm and PVCs rather than A-fib.  Cardiology recommended aspirin no anticoagulant.  Because of fall risk.  His renal function improved with IV hydration.  CPK trended down.  His white count improved with hydration.  Did not require antibiotics.  Patient is debilitated weak and admitted here for skilled rehab.    Patient seen today for above. He is cotnact droplet isolation 2/2 covid. He deneis any fever cough no requiring 02.    PMH:   Hypertension  Lumbar spinal stenosis  Spinal fusion surgery x 2  Dislocated left shoulder SP closed reduction  Prostrate CA SP prostatectomy       Medications: Reviewed in EMR and assessment and plan    Social History / Family History:       reports that he has never smoked. He has never used smokeless tobacco. He reports current alcohol use. He reports that he does not use drugs.    Family History   Problem Relation Age of Onset    Anesth Problems Neg Hx         ROS: Review of Systems   Constitutional:  Positive for appetite change.   Respiratory:  Positive for cough.    Cardiovascular: Negative.  Negative for chest pain.   Gastrointestinal: Negative.    Genitourinary:

## 2024-03-12 ENCOUNTER — OFFICE VISIT (OUTPATIENT)
Facility: CLINIC | Age: 89
End: 2024-03-12
Payer: MEDICARE

## 2024-03-12 VITALS
RESPIRATION RATE: 18 BRPM | SYSTOLIC BLOOD PRESSURE: 127 MMHG | DIASTOLIC BLOOD PRESSURE: 69 MMHG | OXYGEN SATURATION: 97 % | HEART RATE: 77 BPM | TEMPERATURE: 97.2 F

## 2024-03-12 DIAGNOSIS — I10 PRIMARY HYPERTENSION: ICD-10-CM

## 2024-03-12 DIAGNOSIS — R29.6 REPEATED FALLS: ICD-10-CM

## 2024-03-12 DIAGNOSIS — U07.1 COVID-19: Primary | ICD-10-CM

## 2024-03-12 DIAGNOSIS — M48.062 LUMBAR STENOSIS WITH NEUROGENIC CLAUDICATION: ICD-10-CM

## 2024-03-12 DIAGNOSIS — T79.6XXD TRAUMATIC RHABDOMYOLYSIS, SUBSEQUENT ENCOUNTER: ICD-10-CM

## 2024-03-12 PROCEDURE — 99309 SBSQ NF CARE MODERATE MDM 30: CPT | Performed by: NURSE PRACTITIONER

## 2024-03-12 PROCEDURE — 1123F ACP DISCUSS/DSCN MKR DOCD: CPT | Performed by: NURSE PRACTITIONER

## 2024-03-12 PROCEDURE — G8484 FLU IMMUNIZE NO ADMIN: HCPCS | Performed by: NURSE PRACTITIONER

## 2024-03-12 ASSESSMENT — ENCOUNTER SYMPTOMS
COUGH: 1
GASTROINTESTINAL NEGATIVE: 1

## 2024-03-12 NOTE — PROGRESS NOTES
PLACE OF SERVICE:  [facility] [address] [place] [address]    SKILLED VISIT    3/12/2024    Chief Complaint:    Chief Complaint   Patient presents with    Follow-up       HPI : William Gould is a 88 y.o. male  with history of hypertension history of lumbar spinal stenosis with claudication and history of frequent falls reports falling at home in the middle of the night.  Patient was trying to get out of bed was sitting on the edge of the bed and slid to the floor.  He was unable to get up and was on the floor for 4 to 5 hours.  He was found covered in feces.  In the ER his CT head was negative CT of the abdomen pelvis is unremarkable CPK was 1700.  White count was 17,000 he was in A-fib.  He was admitted to the hospitalist team.  He was seen by cardiology echo was normal.  Cardiology impression was artifact with sinus rhythm and PVCs rather than A-fib.  Cardiology recommended aspirin no anticoagulant.  Because of fall risk.  His renal function improved with IV hydration.  CPK trended down.  His white count improved with hydration.  Did not require antibiotics.  Patient is debilitated weak and admitted here for skilled rehab.    Patient seen today in skilled follow up.  On exam he is lying in bed.  He is awake and alert.  He states his cough is improving.  Continue Mucinex.  Blood work reviewed and stable.  BUN within normal limits.  Continue to encourage p.o. fluid intake.  Blood pressure reviewed and trend has improved off antihypertensive medications.  Encourage participation with PT OT therapy.  On exam patient denies chest pain shortness of breath nausea vomiting diarrhea.    PMH:   Hypertension  Lumbar spinal stenosis  Spinal fusion surgery x 2  Dislocated left shoulder SP closed reduction  Prostrate CA SP prostatectomy       Medications: Reviewed in EMR and assessment and plan    Social History / Family History:       reports that he has never smoked. He has never used smokeless

## 2024-04-29 ENCOUNTER — TRANSCRIBE ORDERS (OUTPATIENT)
Facility: HOSPITAL | Age: 89
End: 2024-04-29

## 2024-04-29 DIAGNOSIS — M51.37 DEGENERATION OF LUMBAR OR LUMBOSACRAL INTERVERTEBRAL DISC: Primary | ICD-10-CM

## 2024-05-09 ENCOUNTER — HOSPITAL ENCOUNTER (OUTPATIENT)
Facility: HOSPITAL | Age: 89
Discharge: HOME | End: 2024-05-09
Payer: MEDICARE

## 2024-05-09 DIAGNOSIS — M51.37 DEGENERATION OF LUMBAR OR LUMBOSACRAL INTERVERTEBRAL DISC: ICD-10-CM

## 2024-05-09 PROCEDURE — 72158 MRI LUMBAR SPINE W/O & W/DYE: CPT

## 2024-05-09 PROCEDURE — A9579 GAD-BASE MR CONTRAST NOS,1ML: HCPCS | Performed by: FAMILY MEDICINE

## 2024-05-09 PROCEDURE — 6360000004 HC RX CONTRAST MEDICATION: Performed by: FAMILY MEDICINE

## 2024-05-09 RX ADMIN — GADOTERIDOL 15 ML: 279.3 INJECTION, SOLUTION INTRAVENOUS at 16:26

## (undated) DEVICE — SUTURE MCRYL SZ 4-0 L18IN ABSRB UD L16MM PC-3 3/8 CIR PRIM Y845G

## (undated) DEVICE — STERILE POLYISOPRENE POWDER-FREE SURGICAL GLOVES WITH EMOLLIENT COATING: Brand: PROTEXIS

## (undated) DEVICE — INTENDED FOR TISSUE SEPARATION, AND OTHER PROCEDURES THAT REQUIRE A SHARP SURGICAL BLADE TO PUNCTURE OR CUT.: Brand: BARD-PARKER ® CARBON RIB-BACK BLADES

## (undated) DEVICE — DEVON™ KNEE AND BODY STRAP 60" X 3" (1.5 M X 7.6 CM): Brand: DEVON

## (undated) DEVICE — KIT DIL PRB K WIRE DISP FOR EXTRM LUM INTBDY FUS

## (undated) DEVICE — INFECTION CONTROL KIT SYS

## (undated) DEVICE — BIPOLAR IRRIGATOR INTEGRATED TUBING AND BIPOLAR CORD SET, DISPOSABLE

## (undated) DEVICE — SUTURE VCRL SZ 2-0 L18IN ABSRB UD L26MM CP-2 1/2 CIR REV J762D

## (undated) DEVICE — DRAPE C-ARMOUR C-ARM KIT --

## (undated) DEVICE — DRAPE,T,LAPARO,TRANS,STERILE: Brand: MEDLINE

## (undated) DEVICE — SLIM BODY SKIN STAPLER: Brand: APPOSE ULC

## (undated) DEVICE — TRAY CATH OD16FR SIL URIN M STATLOK STBL DEV SURSTP

## (undated) DEVICE — MODULE EMG W/ NVM5 HARN 2 NEEDLE/NEUROVISION 2 SLD GEL

## (undated) DEVICE — GAUZE SPONGES,12 PLY: Brand: CURITY

## (undated) DEVICE — 1200 GUARD II KIT W/5MM TUBE W/O VAC TUBE: Brand: GUARDIAN

## (undated) DEVICE — KENDALL SCD EXPRESS SLEEVES, KNEE LENGTH, MEDIUM: Brand: KENDALL SCD

## (undated) DEVICE — COVER,TABLE,HEAVY DUTY,60"X90",STRL: Brand: MEDLINE

## (undated) DEVICE — BIPOLAR FORCEPS CORD,BANANA LEADS: Brand: VALLEYLAB

## (undated) DEVICE — PREP SKN PREVAIL 40ML APPL --

## (undated) DEVICE — SUTURE VCRL SZ 0 L18IN ABSRB VLT L36MM CT-1 1/2 CIR J740D

## (undated) DEVICE — LAMINECTOMY RICHMOND-LF: Brand: MEDLINE INDUSTRIES, INC.

## (undated) DEVICE — BONE WAX WHITE: Brand: BONE WAX WHITE

## (undated) DEVICE — TOOL 14MH30 LEGEND 14CM 3MM: Brand: MIDAS REX ™

## (undated) DEVICE — DERMABOND SKIN ADH 0.7ML -- DERMABOND ADVANCED 12/BX